# Patient Record
Sex: FEMALE | Race: WHITE | NOT HISPANIC OR LATINO | Employment: FULL TIME | URBAN - METROPOLITAN AREA
[De-identification: names, ages, dates, MRNs, and addresses within clinical notes are randomized per-mention and may not be internally consistent; named-entity substitution may affect disease eponyms.]

---

## 2022-01-12 ENCOUNTER — LAB VISIT (OUTPATIENT)
Dept: LAB | Facility: OTHER | Age: 27
End: 2022-01-12
Payer: COMMERCIAL

## 2022-01-12 ENCOUNTER — OFFICE VISIT (OUTPATIENT)
Dept: OBSTETRICS AND GYNECOLOGY | Facility: CLINIC | Age: 27
End: 2022-01-12
Attending: OBSTETRICS & GYNECOLOGY
Payer: COMMERCIAL

## 2022-01-12 VITALS
BODY MASS INDEX: 22.96 KG/M2 | HEIGHT: 69 IN | DIASTOLIC BLOOD PRESSURE: 70 MMHG | SYSTOLIC BLOOD PRESSURE: 122 MMHG | WEIGHT: 155 LBS

## 2022-01-12 DIAGNOSIS — Z01.419 WELL WOMAN EXAM: Primary | ICD-10-CM

## 2022-01-12 DIAGNOSIS — Z11.3 SCREEN FOR STD (SEXUALLY TRANSMITTED DISEASE): ICD-10-CM

## 2022-01-12 DIAGNOSIS — Z23 NEEDS FLU SHOT: ICD-10-CM

## 2022-01-12 DIAGNOSIS — Z01.419 ENCOUNTER FOR CERVICAL PAP SMEAR WITH PELVIC EXAM: ICD-10-CM

## 2022-01-12 DIAGNOSIS — Z30.09 ENCOUNTER FOR COUNSELING REGARDING CONTRACEPTION: ICD-10-CM

## 2022-01-12 LAB
B-HCG UR QL: NEGATIVE
CTP QC/QA: YES
HBV SURFACE AG SERPL QL IA: NEGATIVE
HIV 1+2 AB+HIV1 P24 AG SERPL QL IA: NEGATIVE

## 2022-01-12 PROCEDURE — 81025 URINE PREGNANCY TEST: CPT | Mod: S$GLB,,, | Performed by: PHYSICIAN ASSISTANT

## 2022-01-12 PROCEDURE — 87624 HPV HI-RISK TYP POOLED RSLT: CPT | Performed by: PHYSICIAN ASSISTANT

## 2022-01-12 PROCEDURE — 3074F PR MOST RECENT SYSTOLIC BLOOD PRESSURE < 130 MM HG: ICD-10-PCS | Mod: CPTII,S$GLB,, | Performed by: PHYSICIAN ASSISTANT

## 2022-01-12 PROCEDURE — 1160F RVW MEDS BY RX/DR IN RCRD: CPT | Mod: CPTII,S$GLB,, | Performed by: PHYSICIAN ASSISTANT

## 2022-01-12 PROCEDURE — 3078F PR MOST RECENT DIASTOLIC BLOOD PRESSURE < 80 MM HG: ICD-10-PCS | Mod: CPTII,S$GLB,, | Performed by: PHYSICIAN ASSISTANT

## 2022-01-12 PROCEDURE — 81025 POCT URINE PREGNANCY: ICD-10-PCS | Mod: S$GLB,,, | Performed by: PHYSICIAN ASSISTANT

## 2022-01-12 PROCEDURE — 36415 COLL VENOUS BLD VENIPUNCTURE: CPT | Performed by: PHYSICIAN ASSISTANT

## 2022-01-12 PROCEDURE — 3008F BODY MASS INDEX DOCD: CPT | Mod: CPTII,S$GLB,, | Performed by: PHYSICIAN ASSISTANT

## 2022-01-12 PROCEDURE — 87591 N.GONORRHOEAE DNA AMP PROB: CPT | Performed by: PHYSICIAN ASSISTANT

## 2022-01-12 PROCEDURE — 3074F SYST BP LT 130 MM HG: CPT | Mod: CPTII,S$GLB,, | Performed by: PHYSICIAN ASSISTANT

## 2022-01-12 PROCEDURE — 99385 PR PREVENTIVE VISIT,NEW,18-39: ICD-10-PCS | Mod: 25,S$GLB,, | Performed by: PHYSICIAN ASSISTANT

## 2022-01-12 PROCEDURE — 88175 CYTOPATH C/V AUTO FLUID REDO: CPT | Performed by: PHYSICIAN ASSISTANT

## 2022-01-12 PROCEDURE — 1159F PR MEDICATION LIST DOCUMENTED IN MEDICAL RECORD: ICD-10-PCS | Mod: CPTII,S$GLB,, | Performed by: PHYSICIAN ASSISTANT

## 2022-01-12 PROCEDURE — 87340 HEPATITIS B SURFACE AG IA: CPT | Performed by: PHYSICIAN ASSISTANT

## 2022-01-12 PROCEDURE — 87389 HIV-1 AG W/HIV-1&-2 AB AG IA: CPT | Performed by: PHYSICIAN ASSISTANT

## 2022-01-12 PROCEDURE — 1159F MED LIST DOCD IN RCRD: CPT | Mod: CPTII,S$GLB,, | Performed by: PHYSICIAN ASSISTANT

## 2022-01-12 PROCEDURE — 87481 CANDIDA DNA AMP PROBE: CPT | Mod: 59 | Performed by: PHYSICIAN ASSISTANT

## 2022-01-12 PROCEDURE — 86592 SYPHILIS TEST NON-TREP QUAL: CPT | Performed by: PHYSICIAN ASSISTANT

## 2022-01-12 PROCEDURE — 87801 DETECT AGNT MULT DNA AMPLI: CPT | Performed by: PHYSICIAN ASSISTANT

## 2022-01-12 PROCEDURE — 3078F DIAST BP <80 MM HG: CPT | Mod: CPTII,S$GLB,, | Performed by: PHYSICIAN ASSISTANT

## 2022-01-12 PROCEDURE — 99999 PR PBB SHADOW E&M-NEW PATIENT-LVL II: CPT | Mod: PBBFAC,,, | Performed by: PHYSICIAN ASSISTANT

## 2022-01-12 PROCEDURE — 3008F PR BODY MASS INDEX (BMI) DOCUMENTED: ICD-10-PCS | Mod: CPTII,S$GLB,, | Performed by: PHYSICIAN ASSISTANT

## 2022-01-12 PROCEDURE — 99999 PR PBB SHADOW E&M-NEW PATIENT-LVL II: ICD-10-PCS | Mod: PBBFAC,,, | Performed by: PHYSICIAN ASSISTANT

## 2022-01-12 PROCEDURE — 99385 PREV VISIT NEW AGE 18-39: CPT | Mod: 25,S$GLB,, | Performed by: PHYSICIAN ASSISTANT

## 2022-01-12 PROCEDURE — 1160F PR REVIEW ALL MEDS BY PRESCRIBER/CLIN PHARMACIST DOCUMENTED: ICD-10-PCS | Mod: CPTII,S$GLB,, | Performed by: PHYSICIAN ASSISTANT

## 2022-01-12 PROCEDURE — 87491 CHLMYD TRACH DNA AMP PROBE: CPT | Performed by: PHYSICIAN ASSISTANT

## 2022-01-12 RX ORDER — SPIRONOLACTONE 100 MG/1
100 TABLET, FILM COATED ORAL DAILY
COMMUNITY
Start: 2022-01-11

## 2022-01-12 NOTE — PROGRESS NOTES
Subjective:       Patient ID: Mandy Ayon is a 26 y.o. female.    Chief Complaint   Patient presents with    Well Woman       History of Present Illness:    Mandy Ayon is a  26 y.o. woman who presents for her annual exam.     Annual Exam-Premenopausal  Patient presents for annual exam. The patient has no complaints today. The patient is sexually active. GYN screening history: no prior history of gyn screening tests. The patient reports that there is not domestic violence in her life.  Contraception Counseling  Patient presents for contraception counseling. The patient has no complaints today. The patient is sexually active. Pertinent past medical history: none.    Vaginal Discharge and Irritation  Patient presents for vaginal discharge check. Sexual history reviewed with the patient. STD exposure: denies knowledge of risky exposure.  Previous history of STD:  none. Current symptoms include none.  Contraception: none.    Covid vaccine status: vaccinated  Flu vaccine status: getting today  Gardasil vaccine status: vaccinated      Menstrual History: reports periods are regular - lasting 5-7 days; denies dysmenorrhea or menorrhagia  Obstetric Hx:   STD/STI Hx: none  Contraception Hx: none      GYN & OB History  Patient's last menstrual period was 2022 (exact date).   Date of last PAP: no hx    OB History    Para Term  AB Living   0 0 0 0 0 0   SAB IAB Ectopic Multiple Live Births   0 0 0 0 0        History reviewed. No pertinent past medical history.     History reviewed. No pertinent surgical history.     Social History     Socioeconomic History    Marital status: Single   Tobacco Use    Smoking status: Never Smoker    Smokeless tobacco: Never Used   Substance and Sexual Activity    Alcohol use: Yes    Drug use: Never    Sexual activity: Yes     Partners: Male     Birth control/protection: Condom        Family History   Problem Relation Age of Onset    Breast  cancer Neg Hx     Colon cancer Neg Hx     Ovarian cancer Neg Hx           ROS:  GENERAL: Feeling well overall. Denies fever or chills.   SKIN: Denies rash or lesions.   HEAD: Denies head injury or headache.   NODES: Denies enlarged lymph nodes.   CHEST: Denies chest pain or shortness of breath.   CARDIOVASCULAR: Denies palpitations or left sided chest pain.   ABDOMEN: No abdominal pain, constipation, diarrhea, nausea, vomiting or rectal bleeding.   URINARY: No dysuria, hematuria, or burning on urination.  REPRODUCTIVE: See HPI.   BREASTS: Denies pain, lumps, or nipple discharge.   HEMATOLOGIC: No easy bruisability or excessive bleeding.   MUSCULOSKELETAL: Denies joint pain or swelling.   NEUROLOGIC: Denies syncope or weakness.   PSYCHIATRIC: Denies depression, anxiety or mood swings.      Objective:     PE:   APPEARANCE: Well nourished, well developed female in no acute distress.  NODES: no cervical, supraclavicular, or inguinal lymphadenopathy  BREASTS: Symmetrical, no skin changes or visible lesions. No palpable masses, nipple discharge or adenopathy bilaterally.  ABDOMEN: Soft. No tenderness or masses. No distention. No hernias palpated. No CVA tenderness.  VULVA: No lesions. Normal external female genitalia.  URETHRAL MEATUS: Normal size and location, no lesions, no prolapse.  URETHRA: No masses, tenderness, or prolapse.  VAGINA: Moist. No lesions or lacerations noted. No abnormal discharge present. No odor present.    CERVIX: No lesions or discharge. No cervical motion tenderness.   UTERUS: Normal size, regular shape, mobile, non-tender.  ADNEXA: No tenderness. No fullness or masses palpated in the adnexal regions.   ANUS PERINEUM: Normal.  SKIN: No rashes, lesions, ulcers, acne, hirsutism.  RESP: Normal respiratory effort.  MENTAL STATUS: Alert, oriented x 3, normal affect and mood.    Assessment:     1. Well woman exam    2. Encounter for cervical Pap smear with pelvic exam    3. Screen for STD (sexually  transmitted disease)    4. Encounter for counseling regarding contraception    5. Needs flu shot         Office Visit on 01/12/2022   Component Date Value Ref Range Status    POC Preg Test, Ur 01/12/2022 Negative  Negative Final     Acceptable 01/12/2022 Yes   Final       Plan:     Well woman exam  -     POCT Urine Pregnancy    Encounter for cervical Pap smear with pelvic exam  -     Liquid-Based Pap Smear, Screening  -     HPV High Risk Genotypes, PCR    Screen for STD (sexually transmitted disease)  -     C. trachomatis/N. gonorrhoeae by AMP DNA [facility]; Cervix  -     Vaginosis Screen by DNA Probe  -     HIV 1/2 Ag/Ab (4th Gen); Future; Expected date: 01/12/2022  -     RPR; Future; Expected date: 01/12/2022  -     Hepatitis B Surface Antigen; Future; Expected date: 01/12/2022    Encounter for counseling regarding contraception    Needs flu shot  -     Influenza - Quadrivalent *Preferred* (6 months+) (PF); Future; Expected date: 01/12/2022         Well Woman:    - Pap smear: collected  - Birth control: interested in contraception.  Patient was counseled today on contraceptive options: barrier, hormonal (OCPs, Depo-Provera, NuvaRing, Nexplanon), IUDs (Kyleena, Mirena, ParaGard), etc. Patient would like nexplanon. Orders place and pt scheduled for insertion.  - GC/CT: Collected  - Affirm: Collected  - RPR/HIV/Hep B: ordered and scheduled in lab  - Mammogram: n/a  - Smoking cessation: n/a  - Vaccines: covid, hpv vaccinated; flu today      Patient was counseled today on the new ACS guidelines for cervical cytology screening as well as the current recommendations for breast cancer screening. She was counseled to follow up with her PCP for other routine health maintenance.     F/u in 1 year or sooner PRN.    Chacha Hagan PA-C

## 2022-01-13 LAB — RPR SER QL: NORMAL

## 2022-01-18 LAB
C TRACH DNA SPEC QL NAA+PROBE: NOT DETECTED
CYTOLOGIST CVX/VAG CYTO: NORMAL
CYTOLOGY CVX/VAG DOC CYTO: NORMAL
CYTOLOGY CVX/VAG DOC THIN PREP: NORMAL
CYTOLOGY THINPREP PAP COMMENT: NORMAL
HPV HR 12 DNA CVX QL NAA+PROBE: NEGATIVE
HPV16 DNA CVX QL NAA+PROBE: NEGATIVE
HPV18 DNA CVX QL NAA+PROBE: NEGATIVE
N GONORRHOEA DNA SPEC QL NAA+PROBE: NOT DETECTED
PAP NOTE: NORMAL
STAT OF ADQ CVX/VAG CYTO-IMP: NORMAL

## 2022-01-19 DIAGNOSIS — B96.89 BACTERIAL VAGINOSIS: Primary | ICD-10-CM

## 2022-01-19 DIAGNOSIS — N76.0 BACTERIAL VAGINOSIS: Primary | ICD-10-CM

## 2022-01-19 LAB
BACTERIAL VAGINOSIS DNA: POSITIVE
CANDIDA GLABRATA DNA: NEGATIVE
CANDIDA KRUSEI DNA: NEGATIVE
CANDIDA RRNA VAG QL PROBE: NEGATIVE
T VAGINALIS RRNA GENITAL QL PROBE: NEGATIVE

## 2022-01-19 RX ORDER — METRONIDAZOLE 500 MG/1
500 TABLET ORAL EVERY 12 HOURS
Qty: 14 TABLET | Refills: 0 | Status: SHIPPED | OUTPATIENT
Start: 2022-01-19 | End: 2022-01-26

## 2022-02-03 ENCOUNTER — OFFICE VISIT (OUTPATIENT)
Dept: PRIMARY CARE CLINIC | Facility: CLINIC | Age: 27
End: 2022-02-03
Payer: COMMERCIAL

## 2022-02-03 ENCOUNTER — LAB VISIT (OUTPATIENT)
Dept: LAB | Facility: HOSPITAL | Age: 27
End: 2022-02-03
Attending: FAMILY MEDICINE
Payer: COMMERCIAL

## 2022-02-03 VITALS
DIASTOLIC BLOOD PRESSURE: 76 MMHG | WEIGHT: 156.31 LBS | BODY MASS INDEX: 23.15 KG/M2 | HEIGHT: 69 IN | SYSTOLIC BLOOD PRESSURE: 124 MMHG | TEMPERATURE: 98 F | HEART RATE: 114 BPM | OXYGEN SATURATION: 98 %

## 2022-02-03 DIAGNOSIS — R00.0 TACHYCARDIA: Primary | ICD-10-CM

## 2022-02-03 DIAGNOSIS — R63.2 POLYPHAGIA: ICD-10-CM

## 2022-02-03 DIAGNOSIS — Z00.00 ANNUAL PHYSICAL EXAM: ICD-10-CM

## 2022-02-03 DIAGNOSIS — R63.4 WEIGHT LOSS: ICD-10-CM

## 2022-02-03 DIAGNOSIS — R00.0 TACHYCARDIA: ICD-10-CM

## 2022-02-03 PROCEDURE — 99999 PR PBB SHADOW E&M-EST. PATIENT-LVL IV: ICD-10-PCS | Mod: PBBFAC,,, | Performed by: FAMILY MEDICINE

## 2022-02-03 PROCEDURE — 83735 ASSAY OF MAGNESIUM: CPT | Performed by: FAMILY MEDICINE

## 2022-02-03 PROCEDURE — 1160F RVW MEDS BY RX/DR IN RCRD: CPT | Mod: CPTII,S$GLB,, | Performed by: FAMILY MEDICINE

## 2022-02-03 PROCEDURE — 3078F DIAST BP <80 MM HG: CPT | Mod: CPTII,S$GLB,, | Performed by: FAMILY MEDICINE

## 2022-02-03 PROCEDURE — 3008F PR BODY MASS INDEX (BMI) DOCUMENTED: ICD-10-PCS | Mod: CPTII,S$GLB,, | Performed by: FAMILY MEDICINE

## 2022-02-03 PROCEDURE — 99203 PR OFFICE/OUTPT VISIT, NEW, LEVL III, 30-44 MIN: ICD-10-PCS | Mod: S$GLB,,, | Performed by: FAMILY MEDICINE

## 2022-02-03 PROCEDURE — 3078F PR MOST RECENT DIASTOLIC BLOOD PRESSURE < 80 MM HG: ICD-10-PCS | Mod: CPTII,S$GLB,, | Performed by: FAMILY MEDICINE

## 2022-02-03 PROCEDURE — 80053 COMPREHEN METABOLIC PANEL: CPT | Performed by: FAMILY MEDICINE

## 2022-02-03 PROCEDURE — 99999 PR PBB SHADOW E&M-EST. PATIENT-LVL IV: CPT | Mod: PBBFAC,,, | Performed by: FAMILY MEDICINE

## 2022-02-03 PROCEDURE — 3008F BODY MASS INDEX DOCD: CPT | Mod: CPTII,S$GLB,, | Performed by: FAMILY MEDICINE

## 2022-02-03 PROCEDURE — 85025 COMPLETE CBC W/AUTO DIFF WBC: CPT | Performed by: FAMILY MEDICINE

## 2022-02-03 PROCEDURE — 84443 ASSAY THYROID STIM HORMONE: CPT | Performed by: FAMILY MEDICINE

## 2022-02-03 PROCEDURE — 3074F PR MOST RECENT SYSTOLIC BLOOD PRESSURE < 130 MM HG: ICD-10-PCS | Mod: CPTII,S$GLB,, | Performed by: FAMILY MEDICINE

## 2022-02-03 PROCEDURE — 3074F SYST BP LT 130 MM HG: CPT | Mod: CPTII,S$GLB,, | Performed by: FAMILY MEDICINE

## 2022-02-03 PROCEDURE — 99203 OFFICE O/P NEW LOW 30 MIN: CPT | Mod: S$GLB,,, | Performed by: FAMILY MEDICINE

## 2022-02-03 PROCEDURE — 1159F MED LIST DOCD IN RCRD: CPT | Mod: CPTII,S$GLB,, | Performed by: FAMILY MEDICINE

## 2022-02-03 PROCEDURE — 1160F PR REVIEW ALL MEDS BY PRESCRIBER/CLIN PHARMACIST DOCUMENTED: ICD-10-PCS | Mod: CPTII,S$GLB,, | Performed by: FAMILY MEDICINE

## 2022-02-03 PROCEDURE — 36415 COLL VENOUS BLD VENIPUNCTURE: CPT | Mod: PN | Performed by: FAMILY MEDICINE

## 2022-02-03 PROCEDURE — 93005 EKG 12-LEAD: ICD-10-PCS | Mod: S$GLB,,, | Performed by: FAMILY MEDICINE

## 2022-02-03 PROCEDURE — 84439 ASSAY OF FREE THYROXINE: CPT | Performed by: FAMILY MEDICINE

## 2022-02-03 PROCEDURE — 1159F PR MEDICATION LIST DOCUMENTED IN MEDICAL RECORD: ICD-10-PCS | Mod: CPTII,S$GLB,, | Performed by: FAMILY MEDICINE

## 2022-02-03 PROCEDURE — 93010 EKG 12-LEAD: ICD-10-PCS | Mod: S$GLB,,, | Performed by: INTERNAL MEDICINE

## 2022-02-03 PROCEDURE — 86803 HEPATITIS C AB TEST: CPT | Performed by: FAMILY MEDICINE

## 2022-02-03 PROCEDURE — 87389 HIV-1 AG W/HIV-1&-2 AB AG IA: CPT | Performed by: FAMILY MEDICINE

## 2022-02-03 PROCEDURE — 93010 ELECTROCARDIOGRAM REPORT: CPT | Mod: S$GLB,,, | Performed by: INTERNAL MEDICINE

## 2022-02-03 PROCEDURE — 93005 ELECTROCARDIOGRAM TRACING: CPT | Mod: S$GLB,,, | Performed by: FAMILY MEDICINE

## 2022-02-03 NOTE — PROGRESS NOTES
[facility] Note    Chief Complaint      Chief Complaint   Patient presents with    Headache    Shortness of Breath    Palpitations    Generalized Body Aches    Weight Loss       History of Present Illness      Mandy Ayon is a 26 y.o. female with no chronic conditions of who presents today for:    4-5 months of decreased energy, increased heart rate, muscle aches and spasm, weight loss.  wakes with a headache  Takes tylenol in am and improves by 1  SOB with exertion, preceded COVID infection  BM normal  Monthly menses but now fewer days  Urine normal  Appetite increased  No swelling  Feels overheated frequently  Also notes blurred vision    Had covid in December.  Had cold symproms congestion and rhinorrhea.    On aldactone for acne since 2014.   Vitamin C, B12 and oregano oil.    Patient Care Team:  Primary Doctor No as PCP - General     Health Maintenance:  Immunization History   Administered Date(s) Administered    COVID-19, MRNA, LN-S, PF (Pfizer) (Purple Cap) 03/16/2021, 04/06/2021    DTaP 02/28/1996, 04/24/1996, 06/17/1996, 02/27/1998, 08/21/2000    HIB 02/28/1996, 04/24/1996, 06/17/1996, 08/14/1997    HPV Quadrivalent 05/16/2009, 09/21/2009, 12/21/2009    Hepatitis B, Pediatric/Adolescent 1995, 01/17/1996, 09/27/1996    IPV 08/21/2000    Influenza - Quadrivalent 01/29/2020    Influenza A (H1N1) 2009 Monovalent - IM 12/21/2009    MMR 03/14/1997, 08/21/2000    Meningococcal Conjugate (MCV4P) 09/21/2009, 08/09/2014    OPV 02/28/1996, 04/28/1996, 02/27/1998    Tdap 05/30/2007, 08/29/2018      Health Maintenance   Topic Date Due    Hepatitis C Screening  Never done    Lipid Panel  Never done    Pap Smear  01/12/2025    TETANUS VACCINE  08/29/2028    HPV Vaccines  Completed        Past Medical History:  History reviewed. No pertinent past medical history.    Past Surgical History:   has a past surgical history that includes none.    Family History:  family history  "includes Diabetes in her maternal grandmother.     Social History:  Social History     Tobacco Use    Smoking status: Never Smoker    Smokeless tobacco: Never Used   Substance Use Topics    Alcohol use: Yes     Comment: weekly    Drug use: Never       Review of Systems   Constitutional: Negative for fever.   Respiratory: Negative for shortness of breath.    Cardiovascular: Negative for chest pain.   Gastrointestinal: Negative for change in bowel habit and change in bowel habit.   Genitourinary: Negative for difficulty urinating.        Medications:    Current Outpatient Medications:     spironolactone (ALDACTONE) 100 MG tablet, Take 100 mg by mouth once daily., Disp: , Rfl:      Allergies:  Review of patient's allergies indicates:  No Known Allergies    Physical Exam      Vital Signs  Temp: 98 °F (36.7 °C)  Temp src: Oral  Pulse: (!) 114  SpO2: 98 %  BP: 124/76  Pain Score: 0-No pain  Height and Weight  Height: 5' 9" (175.3 cm)  Weight: 70.9 kg (156 lb 4.9 oz)  BSA (Calculated - sq m): 1.86 sq meters  BMI (Calculated): 23.1  Weight in (lb) to have BMI = 25: 168.9             Physical Exam  Vitals reviewed.   Constitutional:       General: She is not in acute distress.     Appearance: Normal appearance.   HENT:      Right Ear: Tympanic membrane, ear canal and external ear normal.      Left Ear: Tympanic membrane, ear canal and external ear normal.      Mouth/Throat:      Mouth: Mucous membranes are moist.      Pharynx: Oropharynx is clear. No oropharyngeal exudate or posterior oropharyngeal erythema.   Eyes:      Extraocular Movements: Extraocular movements intact.      Conjunctiva/sclera: Conjunctivae normal.      Pupils: Pupils are equal, round, and reactive to light.      Comments: exophthalmos - can visualize full circumference of iris at rest   Cardiovascular:      Rate and Rhythm: Regular rhythm. Tachycardia present.      Pulses: Normal pulses.      Heart sounds: No murmur heard.  No friction rub. No " gallop.       Comments: Prominent apical impulse  Pulmonary:      Effort: Pulmonary effort is normal.      Breath sounds: No wheezing, rhonchi or rales.   Abdominal:      General: Abdomen is flat. Bowel sounds are normal. There is no distension.      Palpations: Abdomen is soft. There is no mass.      Tenderness: There is no abdominal tenderness.   Musculoskeletal:      Cervical back: Neck supple.      Right lower leg: No edema.      Left lower leg: No edema.   Lymphadenopathy:      Cervical: No cervical adenopathy.   Skin:     General: Skin is warm and dry.   Neurological:      General: No focal deficit present.      Mental Status: She is alert.   Psychiatric:         Mood and Affect: Mood normal.         Behavior: Behavior normal.          Laboratory:  CBC:        CMP:            URINALYSIS:         LIPIDS:        TSH:        A1C:        Urine Microalbumin/Cr:          Other:             Assessment/Plan     Mandy Ayon is a 26 y.o.female with:    Tachycardia  -     T4, Free; Future; Expected date: 02/03/2022  -     TSH; Future; Expected date: 02/03/2022  -     CBC Auto Differential; Future; Expected date: 02/03/2022  -     Comprehensive Metabolic Panel; Future; Expected date: 02/03/2022  -     Magnesium; Future; Expected date: 02/03/2022  EKG NSR, suspect hyperthyropidism    Polyphagia  -     T4, Free; Future; Expected date: 02/03/2022  -     TSH; Future; Expected date: 02/03/2022  -     CBC Auto Differential; Future; Expected date: 02/03/2022  -     Comprehensive Metabolic Panel; Future; Expected date: 02/03/2022    Weight loss    Annual physical exam  -     HIV 1/2 Ag/Ab (4th Gen); Future; Expected date: 02/03/2022  -     Hepatitis C Antibody; Future; Expected date: 02/03/2022     Suspect hyperthyroidism, check labs and revisit pending results    Chronic conditions status updated as per HPI.  Other than changes above, cont current medications and maintain follow up with specialists.  Return to clinic in  Follow up in about 4 weeks (around 3/3/2022), or if symptoms worsen or fail to improve.      Yolande Case MD  [facility]

## 2022-02-04 ENCOUNTER — PATIENT MESSAGE (OUTPATIENT)
Dept: PRIMARY CARE CLINIC | Facility: CLINIC | Age: 27
End: 2022-02-04
Payer: COMMERCIAL

## 2022-02-04 DIAGNOSIS — E05.90 HYPERTHYROIDISM: Primary | ICD-10-CM

## 2022-02-04 DIAGNOSIS — R79.89 ELEVATED LIVER FUNCTION TESTS: ICD-10-CM

## 2022-02-04 DIAGNOSIS — E83.52 HYPERCALCEMIA: ICD-10-CM

## 2022-02-04 LAB
ALBUMIN SERPL BCP-MCNC: 4.1 G/DL (ref 3.5–5.2)
ALP SERPL-CCNC: 99 U/L (ref 55–135)
ALT SERPL W/O P-5'-P-CCNC: 52 U/L (ref 10–44)
ANION GAP SERPL CALC-SCNC: 11 MMOL/L (ref 8–16)
AST SERPL-CCNC: 71 U/L (ref 10–40)
BASOPHILS # BLD AUTO: 0.04 K/UL (ref 0–0.2)
BASOPHILS NFR BLD: 0.6 % (ref 0–1.9)
BILIRUB SERPL-MCNC: 0.7 MG/DL (ref 0.1–1)
BUN SERPL-MCNC: 10 MG/DL (ref 6–20)
CALCIUM SERPL-MCNC: 12 MG/DL (ref 8.7–10.5)
CHLORIDE SERPL-SCNC: 103 MMOL/L (ref 95–110)
CO2 SERPL-SCNC: 24 MMOL/L (ref 23–29)
CREAT SERPL-MCNC: 0.6 MG/DL (ref 0.5–1.4)
DIFFERENTIAL METHOD: ABNORMAL
EOSINOPHIL # BLD AUTO: 0.1 K/UL (ref 0–0.5)
EOSINOPHIL NFR BLD: 1 % (ref 0–8)
ERYTHROCYTE [DISTWIDTH] IN BLOOD BY AUTOMATED COUNT: 12.7 % (ref 11.5–14.5)
EST. GFR  (AFRICAN AMERICAN): >60 ML/MIN/1.73 M^2
EST. GFR  (NON AFRICAN AMERICAN): >60 ML/MIN/1.73 M^2
GLUCOSE SERPL-MCNC: 75 MG/DL (ref 70–110)
HCT VFR BLD AUTO: 40.1 % (ref 37–48.5)
HCV AB SERPL QL IA: NEGATIVE
HGB BLD-MCNC: 12.4 G/DL (ref 12–16)
HIV 1+2 AB+HIV1 P24 AG SERPL QL IA: NEGATIVE
IMM GRANULOCYTES # BLD AUTO: 0.02 K/UL (ref 0–0.04)
IMM GRANULOCYTES NFR BLD AUTO: 0.3 % (ref 0–0.5)
LYMPHOCYTES # BLD AUTO: 2 K/UL (ref 1–4.8)
LYMPHOCYTES NFR BLD: 28.8 % (ref 18–48)
MAGNESIUM SERPL-MCNC: 1.3 MG/DL (ref 1.6–2.6)
MCH RBC QN AUTO: 26.4 PG (ref 27–31)
MCHC RBC AUTO-ENTMCNC: 30.9 G/DL (ref 32–36)
MCV RBC AUTO: 85 FL (ref 82–98)
MONOCYTES # BLD AUTO: 0.6 K/UL (ref 0.3–1)
MONOCYTES NFR BLD: 8.6 % (ref 4–15)
NEUTROPHILS # BLD AUTO: 4.3 K/UL (ref 1.8–7.7)
NEUTROPHILS NFR BLD: 60.7 % (ref 38–73)
NRBC BLD-RTO: 0 /100 WBC
PLATELET # BLD AUTO: 313 K/UL (ref 150–450)
PMV BLD AUTO: 11.6 FL (ref 9.2–12.9)
POTASSIUM SERPL-SCNC: 4.2 MMOL/L (ref 3.5–5.1)
PROT SERPL-MCNC: 7.6 G/DL (ref 6–8.4)
RBC # BLD AUTO: 4.7 M/UL (ref 4–5.4)
SODIUM SERPL-SCNC: 138 MMOL/L (ref 136–145)
T4 FREE SERPL-MCNC: 2.83 NG/DL (ref 0.71–1.51)
TSH SERPL DL<=0.005 MIU/L-ACNC: <0.01 UIU/ML (ref 0.4–4)
WBC # BLD AUTO: 7.09 K/UL (ref 3.9–12.7)

## 2022-02-04 RX ORDER — ATENOLOL 25 MG/1
25 TABLET ORAL DAILY
Qty: 90 TABLET | Refills: 0 | Status: SHIPPED | OUTPATIENT
Start: 2022-02-04 | End: 2022-06-10

## 2022-02-05 NOTE — PROGRESS NOTES
Can we try to get her an appointment wit endocrine this week.  She has Graves which requires urgent treatment. I put referral in as urgent.

## 2022-02-07 ENCOUNTER — TELEPHONE (OUTPATIENT)
Dept: PRIMARY CARE CLINIC | Facility: CLINIC | Age: 27
End: 2022-02-07
Payer: COMMERCIAL

## 2022-02-07 NOTE — TELEPHONE ENCOUNTER
Left message with patient to further explain diagnosis and confirm she got propranolol and that we got her endocrine appt on 2/9

## 2022-02-08 ENCOUNTER — PROCEDURE VISIT (OUTPATIENT)
Dept: OBSTETRICS AND GYNECOLOGY | Facility: CLINIC | Age: 27
End: 2022-02-08
Payer: COMMERCIAL

## 2022-02-08 DIAGNOSIS — Z30.017 NEXPLANON INSERTION: Primary | ICD-10-CM

## 2022-02-08 LAB
B-HCG UR QL: NEGATIVE
CTP QC/QA: YES

## 2022-02-08 PROCEDURE — 11981 INSERTION DRUG DLVR IMPLANT: CPT | Mod: S$GLB,,, | Performed by: PHYSICIAN ASSISTANT

## 2022-02-08 PROCEDURE — 11981 INSERTION OF NEXPLANON: ICD-10-PCS | Mod: S$GLB,,, | Performed by: PHYSICIAN ASSISTANT

## 2022-02-08 PROCEDURE — 81025 POCT URINE PREGNANCY: ICD-10-PCS | Mod: S$GLB,,, | Performed by: PHYSICIAN ASSISTANT

## 2022-02-08 PROCEDURE — 81025 URINE PREGNANCY TEST: CPT | Mod: S$GLB,,, | Performed by: PHYSICIAN ASSISTANT

## 2022-02-08 NOTE — PROCEDURES
Insertion of Nexplanon    Date/Time: 2/8/2022 9:20 AM  Performed by: Chacha Hagan PA-C  Authorized by: Chacha Hagan PA-C     Consent obtained:  Verbal and written  Consent given by:  Patient  Patient questions answered: yes    Patient agrees, verbalizes understanding, and wants to proceed: yes    Educational handouts given: yes    Instructions and paperwork completed: yes    Premeds:  Acetaminophen  Prepped with: alcohol 70% and povidone-iodine    Local anesthetic:  Lidocaine 1% (5 mL)  The site was cleaned and prepped in a sterile fashion: yes    Small stab incision was made in arm: yes    Left/right:  Left   68 mg etonogestrel 68 mg  Preloaded Implanon trocar was placed subdermally: yes    Visualization of implant was obtained: yes    Nexplanon was inserted and trocar removed: yes    Visualization of notch in stilette and palpitation of device: yes    Palpitation confirms placement by provider and patient: yes    Site was closed with steri-strips and pressure bandage applied: yes

## 2022-02-09 ENCOUNTER — OFFICE VISIT (OUTPATIENT)
Dept: ENDOCRINOLOGY | Facility: CLINIC | Age: 27
End: 2022-02-09
Payer: COMMERCIAL

## 2022-02-09 VITALS
HEIGHT: 69 IN | HEART RATE: 68 BPM | WEIGHT: 160.63 LBS | OXYGEN SATURATION: 99 % | BODY MASS INDEX: 23.79 KG/M2 | DIASTOLIC BLOOD PRESSURE: 70 MMHG | SYSTOLIC BLOOD PRESSURE: 105 MMHG

## 2022-02-09 DIAGNOSIS — E83.52 HYPERCALCEMIA: ICD-10-CM

## 2022-02-09 DIAGNOSIS — E05.00 GRAVES' DISEASE: Primary | ICD-10-CM

## 2022-02-09 DIAGNOSIS — E05.00 GRAVES' EYE DISEASE: ICD-10-CM

## 2022-02-09 DIAGNOSIS — E05.90 HYPERTHYROIDISM: ICD-10-CM

## 2022-02-09 DIAGNOSIS — R74.8 ABNORMAL LIVER ENZYMES: ICD-10-CM

## 2022-02-09 PROCEDURE — 3074F SYST BP LT 130 MM HG: CPT | Mod: CPTII,S$GLB,, | Performed by: INTERNAL MEDICINE

## 2022-02-09 PROCEDURE — 3008F BODY MASS INDEX DOCD: CPT | Mod: CPTII,S$GLB,, | Performed by: INTERNAL MEDICINE

## 2022-02-09 PROCEDURE — 99999 PR PBB SHADOW E&M-EST. PATIENT-LVL V: CPT | Mod: PBBFAC,,, | Performed by: INTERNAL MEDICINE

## 2022-02-09 PROCEDURE — 1160F PR REVIEW ALL MEDS BY PRESCRIBER/CLIN PHARMACIST DOCUMENTED: ICD-10-PCS | Mod: CPTII,S$GLB,, | Performed by: INTERNAL MEDICINE

## 2022-02-09 PROCEDURE — 1159F MED LIST DOCD IN RCRD: CPT | Mod: CPTII,S$GLB,, | Performed by: INTERNAL MEDICINE

## 2022-02-09 PROCEDURE — 1160F RVW MEDS BY RX/DR IN RCRD: CPT | Mod: CPTII,S$GLB,, | Performed by: INTERNAL MEDICINE

## 2022-02-09 PROCEDURE — 99999 PR PBB SHADOW E&M-EST. PATIENT-LVL V: ICD-10-PCS | Mod: PBBFAC,,, | Performed by: INTERNAL MEDICINE

## 2022-02-09 PROCEDURE — 3078F PR MOST RECENT DIASTOLIC BLOOD PRESSURE < 80 MM HG: ICD-10-PCS | Mod: CPTII,S$GLB,, | Performed by: INTERNAL MEDICINE

## 2022-02-09 PROCEDURE — 3008F PR BODY MASS INDEX (BMI) DOCUMENTED: ICD-10-PCS | Mod: CPTII,S$GLB,, | Performed by: INTERNAL MEDICINE

## 2022-02-09 PROCEDURE — 3078F DIAST BP <80 MM HG: CPT | Mod: CPTII,S$GLB,, | Performed by: INTERNAL MEDICINE

## 2022-02-09 PROCEDURE — 3074F PR MOST RECENT SYSTOLIC BLOOD PRESSURE < 130 MM HG: ICD-10-PCS | Mod: CPTII,S$GLB,, | Performed by: INTERNAL MEDICINE

## 2022-02-09 PROCEDURE — 99204 OFFICE O/P NEW MOD 45 MIN: CPT | Mod: S$GLB,,, | Performed by: INTERNAL MEDICINE

## 2022-02-09 PROCEDURE — 1159F PR MEDICATION LIST DOCUMENTED IN MEDICAL RECORD: ICD-10-PCS | Mod: CPTII,S$GLB,, | Performed by: INTERNAL MEDICINE

## 2022-02-09 PROCEDURE — 99204 PR OFFICE/OUTPT VISIT, NEW, LEVL IV, 45-59 MIN: ICD-10-PCS | Mod: S$GLB,,, | Performed by: INTERNAL MEDICINE

## 2022-02-09 RX ORDER — IBUPROFEN 200 MG
200 TABLET ORAL EVERY 6 HOURS PRN
COMMUNITY

## 2022-02-09 RX ORDER — METHIMAZOLE 10 MG/1
20 TABLET ORAL DAILY
Qty: 60 TABLET | Refills: 11 | Status: SHIPPED | OUTPATIENT
Start: 2022-02-09 | End: 2022-03-09 | Stop reason: SDUPTHER

## 2022-02-09 NOTE — ASSESSMENT & PLAN NOTE
reviewed treatment options of I131, ATD or surgery, surgery being least desirable.    Discussed risks of worsening GO with I131  Review the connection of GO with tob use    Discussed the chance of Graves remission (30%) after 2 years of ATD therapy - with a 50% chance of recurrence  Patient opted for ATD - start methimazole 20 mg qd     Call if fever, sore throat, rash, anorexia, nausea or vomiitng.  Reviewed side effects of ATDs are rare (agranulocytosis and liver failure) but can be very serious.     Labs today and in 4 weeks  Refer to ophtho           Reviewed that Selenium supplementation may decrease inflammatory activity in patients with autoimmune thyroiditis,  and may improve symptoms in patients with mild Graves ophthalmopathy   Dose is 100 mcg twice daily.

## 2022-02-09 NOTE — PROGRESS NOTES
"Subjective:      Patient ID: Mandy Ayon is a 26 y.o. female.    Chief Complaint:  Hyperthyroidism      History of Present Illness  With regards to her hyperthyroidism:    was found to have hyperthyroidism on labs  Lab Results   Component Value Date    TSH <0.010 (L) 02/03/2022         presented with fatigue and palpitations - since nov/dec   Weight loss    Etiology likely  Graves     Thyroid scan: none     Thyroid ultrasound: none     Thyroid ab done:none     Treatment:none     I131 therapy  none      Current hyperthyroid medication: none       On atenolol and is feeling better     current symptoms:   +palpations  weight loss has stabilized   +hungry all the time   Nl bm  No Hair loss  No Brittle nails     +tremor   + anxiety    Insertion of Nexplanon  2/8/22    Denies new eye symptoms:    Is tolerating selenium     On Aldactone for acne    Works for a [employer]       ROS:   As above    Objective:     /70   Pulse 68   Ht 5' 9" (1.753 m)   Wt 72.8 kg (160 lb 9.7 oz)   LMP 02/08/2022 (Exact Date)   SpO2 99%   BMI 23.72 kg/m²   BP Readings from Last 3 Encounters:   02/09/22 105/70   02/03/22 124/76   01/12/22 122/70     Wt Readings from Last 1 Encounters:   02/09/22 0929 72.8 kg (160 lb 9.7 oz)     Body mass index is 23.72 kg/m².      Physical Exam  Vitals reviewed.   Neck:      Comments: Goiter palpable symmetric non tender   Cardiovascular:      Rate and Rhythm: Normal rate.      Pulses: Normal pulses.   Pulmonary:      Breath sounds: Normal breath sounds.   Abdominal:      Palpations: Abdomen is soft.   Lymphadenopathy:      Cervical: No cervical adenopathy.     anxious, tremor onycholysis  +proptosis            Lab Review:   No results found for: HGBA1C  No results found for: CHOL, HDL, LDLCALC, TRIG, CHOLHDL  Lab Results   Component Value Date     02/03/2022    K 4.2 02/03/2022     02/03/2022    CO2 24 02/03/2022    GLU 75 02/03/2022    BUN 10 02/03/2022    CREATININE 0.6 " 02/03/2022    CALCIUM 12.0 (H) 02/03/2022    PROT 7.6 02/03/2022    ALBUMIN 4.1 02/03/2022    BILITOT 0.7 02/03/2022    ALKPHOS 99 02/03/2022    AST 71 (H) 02/03/2022    ALT 52 (H) 02/03/2022    ANIONGAP 11 02/03/2022    ESTGFRAFRICA >60.0 02/03/2022    EGFRNONAA >60.0 02/03/2022    TSH <0.010 (L) 02/03/2022     No results found for: [id number]    Assessment and Plan     Graves' disease  reviewed treatment options of I131, ATD or surgery, surgery being least desirable.    Discussed risks of worsening GO with I131  Review the connection of GO with tob use    Discussed the chance of Graves remission (30%) after 2 years of ATD therapy - with a 50% chance of recurrence  Patient opted for ATD - start methimazole 20 mg qd     Call if fever, sore throat, rash, anorexia, nausea or vomiitng.  Reviewed side effects of ATDs are rare (agranulocytosis and liver failure) but can be very serious.     Labs today and in 4 weeks  Refer to ophtho           Reviewed that Selenium supplementation may decrease inflammatory activity in patients with autoimmune thyroiditis,  and may improve symptoms in patients with mild Graves ophthalmopathy   Dose is 100 mcg twice daily.          Graves' eye disease  As above  Start selenium     Hypercalcemia  Recheck and check pth  Suspect due to graves     Abnormal liver enzymes  Recheck and follow   suspect due to graves

## 2022-02-09 NOTE — PATIENT INSTRUCTIONS
Selenium may improve symptoms in patients with mild Graves ophthalmopathy   Dose is 100 mcg twice daily.

## 2022-02-10 ENCOUNTER — LAB VISIT (OUTPATIENT)
Dept: LAB | Facility: HOSPITAL | Age: 27
End: 2022-02-10
Attending: INTERNAL MEDICINE
Payer: COMMERCIAL

## 2022-02-10 DIAGNOSIS — E83.52 HYPERCALCEMIA: ICD-10-CM

## 2022-02-10 DIAGNOSIS — E05.00 GRAVES' DISEASE: ICD-10-CM

## 2022-02-10 LAB
ALBUMIN SERPL BCP-MCNC: 3.7 G/DL (ref 3.5–5.2)
ANION GAP SERPL CALC-SCNC: 9 MMOL/L (ref 8–16)
BUN SERPL-MCNC: 12 MG/DL (ref 6–20)
CALCIUM SERPL-MCNC: 11.5 MG/DL (ref 8.7–10.5)
CHLORIDE SERPL-SCNC: 105 MMOL/L (ref 95–110)
CO2 SERPL-SCNC: 23 MMOL/L (ref 23–29)
CREAT SERPL-MCNC: 0.6 MG/DL (ref 0.5–1.4)
EST. GFR  (AFRICAN AMERICAN): >60 ML/MIN/1.73 M^2
EST. GFR  (NON AFRICAN AMERICAN): >60 ML/MIN/1.73 M^2
GLUCOSE SERPL-MCNC: 110 MG/DL (ref 70–110)
PHOSPHATE SERPL-MCNC: 3.9 MG/DL (ref 2.7–4.5)
POTASSIUM SERPL-SCNC: 3.9 MMOL/L (ref 3.5–5.1)
PTH-INTACT SERPL-MCNC: 7.1 PG/ML (ref 9–77)
SODIUM SERPL-SCNC: 137 MMOL/L (ref 136–145)
T3 SERPL-MCNC: >500 NG/DL (ref 60–180)
T4 FREE SERPL-MCNC: 2.42 NG/DL (ref 0.71–1.51)
TSH SERPL DL<=0.005 MIU/L-ACNC: <0.01 UIU/ML (ref 0.4–4)

## 2022-02-10 PROCEDURE — 80069 RENAL FUNCTION PANEL: CPT | Performed by: INTERNAL MEDICINE

## 2022-02-10 PROCEDURE — 83520 IMMUNOASSAY QUANT NOS NONAB: CPT | Performed by: INTERNAL MEDICINE

## 2022-02-10 PROCEDURE — 82652 VIT D 1 25-DIHYDROXY: CPT | Performed by: INTERNAL MEDICINE

## 2022-02-10 PROCEDURE — 84439 ASSAY OF FREE THYROXINE: CPT | Performed by: INTERNAL MEDICINE

## 2022-02-10 PROCEDURE — 83970 ASSAY OF PARATHORMONE: CPT | Performed by: INTERNAL MEDICINE

## 2022-02-10 PROCEDURE — 36415 COLL VENOUS BLD VENIPUNCTURE: CPT | Mod: PN | Performed by: INTERNAL MEDICINE

## 2022-02-10 PROCEDURE — 84443 ASSAY THYROID STIM HORMONE: CPT | Performed by: INTERNAL MEDICINE

## 2022-02-10 PROCEDURE — 84480 ASSAY TRIIODOTHYRONINE (T3): CPT | Performed by: INTERNAL MEDICINE

## 2022-02-11 ENCOUNTER — PATIENT MESSAGE (OUTPATIENT)
Dept: ENDOCRINOLOGY | Facility: CLINIC | Age: 27
End: 2022-02-11
Payer: COMMERCIAL

## 2022-02-11 DIAGNOSIS — E05.00 GRAVES' DISEASE: Primary | ICD-10-CM

## 2022-02-12 LAB — TSH RECEP AB SER-ACNC: 9.69 IU/L (ref 0–1.75)

## 2022-02-14 LAB — 1,25(OH)2D3 SERPL-MCNC: <5 PG/ML (ref 20–79)

## 2022-03-02 ENCOUNTER — LAB VISIT (OUTPATIENT)
Dept: LAB | Facility: HOSPITAL | Age: 27
End: 2022-03-02
Attending: INTERNAL MEDICINE
Payer: COMMERCIAL

## 2022-03-02 DIAGNOSIS — E05.00 GRAVES' DISEASE: ICD-10-CM

## 2022-03-02 LAB
ALBUMIN SERPL BCP-MCNC: 3.9 G/DL (ref 3.5–5.2)
ALP SERPL-CCNC: 99 U/L (ref 55–135)
ALT SERPL W/O P-5'-P-CCNC: 46 U/L (ref 10–44)
ANION GAP SERPL CALC-SCNC: 11 MMOL/L (ref 8–16)
AST SERPL-CCNC: 70 U/L (ref 10–40)
BILIRUB SERPL-MCNC: 0.5 MG/DL (ref 0.1–1)
BUN SERPL-MCNC: 9 MG/DL (ref 6–20)
CALCIUM SERPL-MCNC: 10.3 MG/DL (ref 8.7–10.5)
CHLORIDE SERPL-SCNC: 103 MMOL/L (ref 95–110)
CO2 SERPL-SCNC: 23 MMOL/L (ref 23–29)
CREAT SERPL-MCNC: 0.6 MG/DL (ref 0.5–1.4)
EST. GFR  (AFRICAN AMERICAN): >60 ML/MIN/1.73 M^2
EST. GFR  (NON AFRICAN AMERICAN): >60 ML/MIN/1.73 M^2
GLUCOSE SERPL-MCNC: 109 MG/DL (ref 70–110)
POTASSIUM SERPL-SCNC: 3.8 MMOL/L (ref 3.5–5.1)
PROT SERPL-MCNC: 7.1 G/DL (ref 6–8.4)
SODIUM SERPL-SCNC: 137 MMOL/L (ref 136–145)
T3 SERPL-MCNC: 236 NG/DL (ref 60–180)
T4 FREE SERPL-MCNC: 1.43 NG/DL (ref 0.71–1.51)
TSH SERPL DL<=0.005 MIU/L-ACNC: <0.01 UIU/ML (ref 0.4–4)

## 2022-03-02 PROCEDURE — 84439 ASSAY OF FREE THYROXINE: CPT | Performed by: INTERNAL MEDICINE

## 2022-03-02 PROCEDURE — 84480 ASSAY TRIIODOTHYRONINE (T3): CPT | Performed by: INTERNAL MEDICINE

## 2022-03-02 PROCEDURE — 36415 COLL VENOUS BLD VENIPUNCTURE: CPT | Mod: PN | Performed by: INTERNAL MEDICINE

## 2022-03-02 PROCEDURE — 80053 COMPREHEN METABOLIC PANEL: CPT | Performed by: INTERNAL MEDICINE

## 2022-03-02 PROCEDURE — 84443 ASSAY THYROID STIM HORMONE: CPT | Performed by: INTERNAL MEDICINE

## 2022-03-04 ENCOUNTER — PATIENT MESSAGE (OUTPATIENT)
Dept: ENDOCRINOLOGY | Facility: CLINIC | Age: 27
End: 2022-03-04
Payer: COMMERCIAL

## 2022-03-09 ENCOUNTER — OFFICE VISIT (OUTPATIENT)
Dept: ENDOCRINOLOGY | Facility: CLINIC | Age: 27
End: 2022-03-09
Payer: COMMERCIAL

## 2022-03-09 DIAGNOSIS — R74.8 ABNORMAL LIVER ENZYMES: ICD-10-CM

## 2022-03-09 DIAGNOSIS — E83.52 HYPERCALCEMIA: ICD-10-CM

## 2022-03-09 DIAGNOSIS — E05.00 GRAVES' EYE DISEASE: ICD-10-CM

## 2022-03-09 DIAGNOSIS — L50.9 HIVES: ICD-10-CM

## 2022-03-09 DIAGNOSIS — E05.00 GRAVES' DISEASE: Primary | ICD-10-CM

## 2022-03-09 PROCEDURE — 1159F MED LIST DOCD IN RCRD: CPT | Mod: CPTII,95,, | Performed by: INTERNAL MEDICINE

## 2022-03-09 PROCEDURE — 1160F PR REVIEW ALL MEDS BY PRESCRIBER/CLIN PHARMACIST DOCUMENTED: ICD-10-PCS | Mod: CPTII,95,, | Performed by: INTERNAL MEDICINE

## 2022-03-09 PROCEDURE — 1159F PR MEDICATION LIST DOCUMENTED IN MEDICAL RECORD: ICD-10-PCS | Mod: CPTII,95,, | Performed by: INTERNAL MEDICINE

## 2022-03-09 PROCEDURE — 99214 PR OFFICE/OUTPT VISIT, EST, LEVL IV, 30-39 MIN: ICD-10-PCS | Mod: 95,,, | Performed by: INTERNAL MEDICINE

## 2022-03-09 PROCEDURE — 99214 OFFICE O/P EST MOD 30 MIN: CPT | Mod: 95,,, | Performed by: INTERNAL MEDICINE

## 2022-03-09 PROCEDURE — 1160F RVW MEDS BY RX/DR IN RCRD: CPT | Mod: CPTII,95,, | Performed by: INTERNAL MEDICINE

## 2022-03-09 RX ORDER — METHIMAZOLE 10 MG/1
20 TABLET ORAL DAILY
Qty: 180 TABLET | Refills: 3 | Status: SHIPPED | OUTPATIENT
Start: 2022-03-09 | End: 2022-06-10 | Stop reason: SDUPTHER

## 2022-03-09 NOTE — PATIENT INSTRUCTIONS
Thank you for completing a virtual visit with me!     Per our conversation :  Continue the methimazole 20 mg a day.  I will send in a new prescription to the pharmacy on file.  Let me know if the hives return so I can put in a referral to Allergy  I will put in the referral for a formal eye exam.  We will recheck labs in 1 month  5.  We will plan a telemedicine or an in-clinic visit in 3 months with labs prior to that appointment.    My staff will contact you to schedule the above.     Please let me know if you have any other questions.    Thank you,  Rosey Ro MD

## 2022-03-09 NOTE — PROGRESS NOTES
Subjective:      Patient ID: Mandy Ayon is a 26 y.o. female.    Chief Complaint:  hyperthyroidism    History of Present Illness  With regards to her hyperthyroidism:    was found to have hyperthyroidism on labs  Lab Results   Component Value Date    TSH <0.010 (L) 03/02/2022         presented with fatigue and palpitations - since nov/dec   Weight loss    Etiology likely  Graves     Thyroid scan: none     Thyroid ultrasound: none     Thyroid ab done:none     Treatment:none     I131 therapy  none      Current hyperthyroid medication:   methimazole 20 mg qd - started feb 2022        On atenolol and is feeling better     current symptoms:   No palpations  weight loss has stabilized   Less hungry    Nl bm  No Hair loss  No Brittle nails     No tremor   No  anxiety    Insertion of Nexplanon  2/8/22    Denies new eye symptoms:    Is tolerating selenium     On Aldactone for acne    Works for [employer]     Did have hives end of feb  - saw derm     Taking benadryl and zyrtec --None now       She was noted to have non PTH mediated hypercalcemia on labs which has now resolved.      Also she was noted to have abnormal liver enzymes which have been stable.  ROS:   As above    Objective:     LMP 02/08/2022 (Exact Date)   BP Readings from Last 3 Encounters:   02/09/22 105/70   02/03/22 124/76   01/12/22 122/70     Wt Readings from Last 1 Encounters:   02/09/22 0929 72.8 kg (160 lb 9.7 oz)     There is no height or weight on file to calculate BMI.      Physical Exam  Psychiatric:         Mood and Affect: Mood normal.         Thought Content: Thought content normal.         Judgment: Judgment normal.              Lab Review:   No results found for: HGBA1C  No results found for: CHOL, HDL, LDLCALC, TRIG, CHOLHDL  Lab Results   Component Value Date     03/02/2022    K 3.8 03/02/2022     03/02/2022    CO2 23 03/02/2022     03/02/2022    BUN 9 03/02/2022    CREATININE 0.6 03/02/2022    CALCIUM 10.3  03/02/2022    PROT 7.1 03/02/2022    ALBUMIN 3.9 03/02/2022    BILITOT 0.5 03/02/2022    ALKPHOS 99 03/02/2022    AST 70 (H) 03/02/2022    ALT 46 (H) 03/02/2022    ANIONGAP 11 03/02/2022    ESTGFRAFRICA >60.0 03/02/2022    EGFRNONAA >60.0 03/02/2022    TSH <0.010 (L) 03/02/2022     No results found for: [id number]    Assessment and Plan     Graves' disease  reviewed treatment options of I131, ATD or surgery, surgery being least desirable.    Discussed risks of worsening GO with I131  Review the connection of GO with tob use    Discussed the chance of Graves remission (30%) after 2 years of ATD therapy - with a 50% chance of recurrence    Continue  methimazole 20 mg qd     Call if fever, sore throat, rash, anorexia, nausea or vomiitng.  Reviewed side effects of ATDs are rare (agranulocytosis and liver failure) but can be very serious.     Labs in 4 weeks  Visit in 3 months   Refer to ophtho           Reviewed that Selenium supplementation may decrease inflammatory activity in patients with autoimmune thyroiditis,  and may improve symptoms in patients with mild Graves ophthalmopathy   Dose is 100 mcg twice daily.          Graves' eye disease  As above   selenium     Abnormal liver enzymes   follow   suspect due to graves   If they do not start to normalize will refer to hepatology    Hives  Advised her to stop the Benadryl  She will let me know if they come back and I will put in a referral to Allergy.      Noted that persistent urticaria can be associated with thyroid disease.    The patient location is: work  The chief complaint leading to consultation is: thyroid    Visit type: audiovisual    Face to Face time with patient: 25 minutes of total time spent on the encounter, which includes face to face time and non-face to face time preparing to see the patient (eg, review of tests), Obtaining and/or reviewing separately obtained history, Documenting clinical information in the electronic or other health record,  Independently interpreting results (not separately reported) and communicating results to the patient/family/caregiver, or Care coordination (not separately reported).         Each patient to whom he or she provides medical services by telemedicine is:  (1) informed of the relationship between the physician and patient and the respective role of any other health care provider with respect to management of the patient; and (2) notified that he or she may decline to receive medical services by telemedicine and may withdraw from such care at any time.

## 2022-03-09 NOTE — ASSESSMENT & PLAN NOTE
reviewed treatment options of I131, ATD or surgery, surgery being least desirable.    Discussed risks of worsening GO with I131  Review the connection of GO with tob use    Discussed the chance of Graves remission (30%) after 2 years of ATD therapy - with a 50% chance of recurrence    Continue  methimazole 20 mg qd     Call if fever, sore throat, rash, anorexia, nausea or vomiitng.  Reviewed side effects of ATDs are rare (agranulocytosis and liver failure) but can be very serious.     Labs in 4 weeks  Visit in 3 months   Refer to ophtho           Reviewed that Selenium supplementation may decrease inflammatory activity in patients with autoimmune thyroiditis,  and may improve symptoms in patients with mild Graves ophthalmopathy   Dose is 100 mcg twice daily.

## 2022-03-09 NOTE — ASSESSMENT & PLAN NOTE
Advised her to stop the Benadryl  She will let me know if they come back and I will put in a referral to Allergy.      Noted that persistent urticaria can be associated with thyroid disease.

## 2022-03-10 ENCOUNTER — OFFICE VISIT (OUTPATIENT)
Dept: PRIMARY CARE CLINIC | Facility: CLINIC | Age: 27
End: 2022-03-10
Payer: COMMERCIAL

## 2022-03-10 ENCOUNTER — TELEPHONE (OUTPATIENT)
Dept: BEHAVIORAL HEALTH | Facility: CLINIC | Age: 27
End: 2022-03-10
Payer: COMMERCIAL

## 2022-03-10 VITALS
SYSTOLIC BLOOD PRESSURE: 118 MMHG | HEIGHT: 69 IN | HEART RATE: 76 BPM | BODY MASS INDEX: 24.2 KG/M2 | OXYGEN SATURATION: 98 % | WEIGHT: 163.38 LBS | TEMPERATURE: 98 F | RESPIRATION RATE: 18 BRPM | DIASTOLIC BLOOD PRESSURE: 72 MMHG

## 2022-03-10 DIAGNOSIS — E05.00 GRAVES' EYE DISEASE: ICD-10-CM

## 2022-03-10 DIAGNOSIS — F41.9 ANXIETY: ICD-10-CM

## 2022-03-10 DIAGNOSIS — R74.8 ABNORMAL LIVER ENZYMES: ICD-10-CM

## 2022-03-10 DIAGNOSIS — E05.00 GRAVES' DISEASE: Primary | ICD-10-CM

## 2022-03-10 PROCEDURE — 1160F PR REVIEW ALL MEDS BY PRESCRIBER/CLIN PHARMACIST DOCUMENTED: ICD-10-PCS | Mod: CPTII,S$GLB,, | Performed by: FAMILY MEDICINE

## 2022-03-10 PROCEDURE — 3074F PR MOST RECENT SYSTOLIC BLOOD PRESSURE < 130 MM HG: ICD-10-PCS | Mod: CPTII,S$GLB,, | Performed by: FAMILY MEDICINE

## 2022-03-10 PROCEDURE — 99999 PR PBB SHADOW E&M-EST. PATIENT-LVL V: ICD-10-PCS | Mod: PBBFAC,,, | Performed by: FAMILY MEDICINE

## 2022-03-10 PROCEDURE — 1159F MED LIST DOCD IN RCRD: CPT | Mod: CPTII,S$GLB,, | Performed by: FAMILY MEDICINE

## 2022-03-10 PROCEDURE — 3074F SYST BP LT 130 MM HG: CPT | Mod: CPTII,S$GLB,, | Performed by: FAMILY MEDICINE

## 2022-03-10 PROCEDURE — 1160F RVW MEDS BY RX/DR IN RCRD: CPT | Mod: CPTII,S$GLB,, | Performed by: FAMILY MEDICINE

## 2022-03-10 PROCEDURE — 3008F PR BODY MASS INDEX (BMI) DOCUMENTED: ICD-10-PCS | Mod: CPTII,S$GLB,, | Performed by: FAMILY MEDICINE

## 2022-03-10 PROCEDURE — 3078F PR MOST RECENT DIASTOLIC BLOOD PRESSURE < 80 MM HG: ICD-10-PCS | Mod: CPTII,S$GLB,, | Performed by: FAMILY MEDICINE

## 2022-03-10 PROCEDURE — 1159F PR MEDICATION LIST DOCUMENTED IN MEDICAL RECORD: ICD-10-PCS | Mod: CPTII,S$GLB,, | Performed by: FAMILY MEDICINE

## 2022-03-10 PROCEDURE — 99214 OFFICE O/P EST MOD 30 MIN: CPT | Mod: S$GLB,,, | Performed by: FAMILY MEDICINE

## 2022-03-10 PROCEDURE — 99214 PR OFFICE/OUTPT VISIT, EST, LEVL IV, 30-39 MIN: ICD-10-PCS | Mod: S$GLB,,, | Performed by: FAMILY MEDICINE

## 2022-03-10 PROCEDURE — 99999 PR PBB SHADOW E&M-EST. PATIENT-LVL V: CPT | Mod: PBBFAC,,, | Performed by: FAMILY MEDICINE

## 2022-03-10 PROCEDURE — 3078F DIAST BP <80 MM HG: CPT | Mod: CPTII,S$GLB,, | Performed by: FAMILY MEDICINE

## 2022-03-10 PROCEDURE — 3008F BODY MASS INDEX DOCD: CPT | Mod: CPTII,S$GLB,, | Performed by: FAMILY MEDICINE

## 2022-03-10 RX ORDER — SELENIUM 100 MCG
1 TABLET ORAL 2 TIMES DAILY
Qty: 180 EACH | Refills: 0 | Status: SHIPPED | OUTPATIENT
Start: 2022-03-10 | End: 2023-03-23

## 2022-03-10 NOTE — PROGRESS NOTES
[facility] Note    Chief Complaint      Chief Complaint   Patient presents with    Follow-up       History of Present Illness      Mandy Ayon is a 26 y.o. female with chronic conditions of graves who presents today for:  followup after starting methimazole    Appetite calmed, less myalgia, less anxious, less palpitation, better stamina - can run again    Reports increasing anxiety over time not just regarding illness but in relationship with her parents and would like to go to therapy    She reports skin sensitivity and urticaria with any pressure/scratching. Itching has decreased overall with antihistamine    Patient Care Team:  Yolande Case MD as PCP - General (Internal Medicine)     Health Maintenance:  Immunization History   Administered Date(s) Administered    COVID-19, MRNA, LN-S, PF (Pfizer) (Purple Cap) 03/16/2021, 04/06/2021    DTaP 02/28/1996, 04/24/1996, 06/17/1996, 02/27/1998, 08/21/2000    HIB 02/28/1996, 04/24/1996, 06/17/1996, 08/14/1997    HPV Quadrivalent 05/16/2009, 09/21/2009, 12/21/2009    Hepatitis B, Pediatric/Adolescent 1995, 01/17/1996, 09/27/1996    IPV 08/21/2000    Influenza - Quadrivalent 01/29/2020    Influenza A (H1N1) 2009 Monovalent - IM 12/21/2009    MMR 03/14/1997, 08/21/2000    Meningococcal Conjugate (MCV4P) 09/21/2009, 08/09/2014    OPV 02/28/1996, 04/28/1996, 02/27/1998    Tdap 05/30/2007, 08/29/2018      Health Maintenance   Topic Date Due    Lipid Panel  Never done    Pap Smear  01/12/2025    TETANUS VACCINE  08/29/2028    Hepatitis C Screening  Completed    HPV Vaccines  Completed        Past Medical History:  Past Medical History:   Diagnosis Date    Hyperthyroidism 02/06/2022       Past Surgical History:   has a past surgical history that includes none.    Family History:  family history includes Diabetes in her maternal grandmother.     Social History:  Social History     Tobacco Use    Smoking status: Never Smoker     "Smokeless tobacco: Never Used   Substance Use Topics    Alcohol use: Yes     Comment: weekly    Drug use: Never       Review of Systems   Constitutional: Negative for fever.   Respiratory: Negative for shortness of breath.    Cardiovascular: Negative for chest pain.   Gastrointestinal: Negative for change in bowel habit and change in bowel habit.   Genitourinary: Negative for difficulty urinating.        Medications:    Current Outpatient Medications:     atenoloL (TENORMIN) 25 MG tablet, Take 1 tablet (25 mg total) by mouth once daily., Disp: 90 tablet, Rfl: 0    ibuprofen (ADVIL,MOTRIN) 200 MG tablet, Take 200 mg by mouth every 6 (six) hours as needed for Pain., Disp: , Rfl:     methIMAzole (TAPAZOLE) 10 MG Tab, Take 2 tablets (20 mg total) by mouth once daily., Disp: 180 tablet, Rfl: 3    spironolactone (ALDACTONE) 100 MG tablet, Take 100 mg by mouth once daily., Disp: , Rfl:     selenium 100 mcg Tab, Take 1 tablet by mouth 2 (two) times a day., Disp: 180 each, Rfl: 0    Current Facility-Administered Medications:     etonogestreL subdermal device 68 mg, 68 mg, Implant, , Chacha Hagan PA-C, 68 mg at 02/08/22 0920     Allergies:  Review of patient's allergies indicates:  No Known Allergies    Physical Exam      Vital Signs  Temp: 98 °F (36.7 °C)  Pulse: 76  Resp: 18  SpO2: 98 %  BP: 118/72  BP Location: Right arm  Patient Position: Sitting  Pain Score: 0-No pain  Height and Weight  Height: 5' 9" (175.3 cm)  Weight: 74.1 kg (163 lb 5.8 oz)  BSA (Calculated - sq m): 1.9 sq meters  BMI (Calculated): 24.1  Weight in (lb) to have BMI = 25: 168.9      Patient Position: Sitting      Physical Exam  Vitals reviewed.   Constitutional:       General: She is not in acute distress.     Appearance: Normal appearance.   HENT:      Right Ear: External ear normal.      Left Ear: External ear normal.   Eyes:      Conjunctiva/sclera: Conjunctivae normal.      Comments: Decrease in exophthalmos (lids now cover inferior " portion of iris and approach superior)   Neck:      Comments: Decrease in thyromegaly from prior  Cardiovascular:      Rate and Rhythm: Normal rate and regular rhythm.      Heart sounds: No murmur heard.    No friction rub. No gallop.   Pulmonary:      Effort: Pulmonary effort is normal.      Breath sounds: No wheezing, rhonchi or rales.   Musculoskeletal:      Cervical back: Neck supple.      Right lower leg: No edema.      Left lower leg: No edema.   Skin:     General: Skin is warm and dry.      Comments: Urticarial excoriation to left shoulder.    Neurological:      General: No focal deficit present.      Mental Status: She is alert.   Psychiatric:         Mood and Affect: Mood normal.         Behavior: Behavior normal.          Laboratory:  CBC:  Recent Labs   Lab 02/03/22 1218   WBC 7.09   RBC 4.70   Hemoglobin 12.4   Hematocrit 40.1   Platelets 313   MCV 85   MCH 26.4 L   MCHC 30.9 L       CMP:  Recent Labs   Lab 02/03/22  1218 02/10/22  0851 03/02/22  1010   Glucose 75   < > 109   Calcium 12.0 H   < > 10.3   Albumin 4.1   < > 3.9   Total Protein 7.6  --  7.1   Sodium 138   < > 137   Potassium 4.2   < > 3.8   CO2 24   < > 23   Chloride 103   < > 103   BUN 10   < > 9   Creatinine 0.6   < > 0.6   Alkaline Phosphatase 99  --  99   ALT 52 H  --  46 H   AST 71 H  --  70 H   Total Bilirubin 0.7  --  0.5    < > = values in this interval not displayed.           URINALYSIS:         LIPIDS:  Recent Labs   Lab 02/03/22  1218 02/10/22  0851 03/02/22  1010   TSH <0.010 L <0.010 L <0.010 L       TSH:  Recent Labs   Lab 02/03/22  1218 02/10/22  0851 03/02/22  1010   TSH <0.010 L <0.010 L <0.010 L       A1C:        Urine Microalbumin/Cr:          Other:       Recent Labs   Lab 02/03/22 1218   Hepatitis C Ab Negative       Assessment/Plan     Mandy Ayon is a 26 y.o.female with:    Graves' disease  Methimazole, selenium, repeat labs, followed by endocrine  Graves' eye disease  Refer to ophthalmology  Abnormal liver  enzymes  -     Hepatitis Panel, Acute; Future; Expected date: 03/10/2022  Add hepatitis panel to scheduled labs and get US if LFTs remain high  Anxiety  -     Ambulatory referral/consult to Primary Care Behavioral Health (Non-Opioids); Future; Expected date: 03/17/2022    Other orders  -     selenium 100 mcg Tab; Take 1 tablet by mouth 2 (two) times a day.  Dispense: 180 each; Refill: 0         Chronic conditions status updated as per HPI.  Other than changes above, cont current medications and maintain follow up with specialists.  Return to clinic in Follow up in about 3 months (around 6/10/2022), or if symptoms worsen or fail to improve, for recheck and ensure adequate followup.      Yolande Case MD  [facility]

## 2022-03-10 NOTE — PROGRESS NOTES
Behavioral Health Community Health Worker  Initial Assessment  Completed by:  Kirti Vickers    Date:  3/10/2022    Patient Enrollment in Behavioral Health Program:  · Patient verbalized understanding of Behavioral Health Integration services to include:  · Patient understands that CHW, LCSW, PharmD and consulting Psychiatrist are members of the care team working collaboratively with his/her primary care provider: Yes  · Patient understands that activation of their [facility] patient portal account is required for accessing the full scope of team services: Yes  · Patient understands that some counseling sessions may occur via video: Yes  · Clinic visits with the psychiatrist may be subject to a co-pay based on your insurance: Yes  · Patient consents to enroll in BHI program: Yes    Assessments     Single Item Health Literacy Scale:  · How often do you need to have someone help you read instructions, pamphlets or other written material from your doctor or pharmacy?: Never    Promis 10:  · Promis 10 Responses  · In general, would you say your health is: Fair  · In general, would you say your quality of life is: Good  · In general, how would you rate your physical health?: Fair  · In general, how would you rate your mental health, including your mood and your ability to think?: Good  · In general, how would you rate your satisfaction with your social activities and relationships?: Good  · In general, please rate how well you carry out your usual social activities and roles. (This includes activities at home, at work and in your community, and responsibilities as a parent, child, spouse, employee, friend, etc.): Good  · To what extent are you able to carry out your everyday physical activities such as walking, climbing stairs, carrying groceries, or moving a chair? : Completely  · How often have you been bothered by emotional problems such as feeling anxious, depressed or irritable?: Often  · In the past 7 days, how would  "you rate your fatigue on average?: None  · In the past 7 days, on a scale of 0 to 10 (where 0 is no pain and 10 is the worst pain imaginable) how would you rate your pain on average?: 1  · Global Physical Health: 9  · Global Mental health Score: 13    Depression PHQ:  PHQ9 3/10/2022   Total Score 5         Generalized Anxiety Disorder 7-Item Scale:  GAD7 3/10/2022   1. Feeling nervous, anxious, or on edge? 3   2. Not being able to stop or control worrying? 0   3. Worrying too much about different things? 0   4. Trouble relaxing? 0   5. Being so restless that it is hard to sit still? 0   6. Becoming easily annoyed or irritable? 1   7. Feeling afraid as if something awful might happen? 1   8. If you checked off any problems, how difficult have these problems made it for you to do your work, take care of things at home, or get along with other people? 1   ILENE-7 Score 5       History     Social History     Socioeconomic History    Marital status: Single   Tobacco Use    Smoking status: Never Smoker    Smokeless tobacco: Never Used   Substance and Sexual Activity    Alcohol use: Yes     Comment: weekly    Drug use: Never    Sexual activity: Yes     Partners: Male     Birth control/protection: Condom       Call Summary     Patient was referred to the BHI (Non-opioid) program by Primary Care Provider, Dr. Yolande Case.  CHW contacted Mandy Ayon who reports depression and anxiety that limits her activities of daily living (ADLs).   Patient scored "5" on the PHQ9 and "5" on the ILENE 7. Based on these scores patient is eligible for the Behavioral Health Integration (Non-opioid) Program. CHW completed the intake and scheduled an office appointment for patient with Emilia Botello LCSW, on 03/17/22 at 4:00pm.         "

## 2022-03-10 NOTE — PATIENT INSTRUCTIONS
Get labs to recheck liver and thyroid in 1 month.  Decrease alcohol intake during this time and I will add hepatitis testing to these labs.  If they remain high we will order ultrasound.  For hives, etiology unknown.  Thyroid vs methimazole vs nexplanon.  Start selenium, take zyrtec and use benadryl as needed and we will monitor hives as thyroid corrects. Selenium is 100mcg twice a day.  Make appointment with ophthalmology  Make appointment with behavioral health

## 2022-03-16 ENCOUNTER — TELEPHONE (OUTPATIENT)
Dept: BEHAVIORAL HEALTH | Facility: CLINIC | Age: 27
End: 2022-03-16
Payer: COMMERCIAL

## 2022-03-16 NOTE — PROGRESS NOTES
CHW reached out to pt to remind her of office appointment with Emilia Botello LCSW, on tomorrow. Left VM of the appointment.

## 2022-03-17 ENCOUNTER — CLINICAL SUPPORT (OUTPATIENT)
Dept: BEHAVIORAL HEALTH | Facility: CLINIC | Age: 27
End: 2022-03-17
Payer: COMMERCIAL

## 2022-03-17 DIAGNOSIS — F43.21 ADJUSTMENT DISORDER WITH DEPRESSED MOOD: ICD-10-CM

## 2022-03-17 DIAGNOSIS — F41.9 ANXIETY: ICD-10-CM

## 2022-03-17 PROCEDURE — 99999 PR PBB SHADOW E&M-EST. PATIENT-LVL I: ICD-10-PCS | Mod: PBBFAC,,, | Performed by: SOCIAL WORKER

## 2022-03-17 PROCEDURE — 90791 PR PSYCHIATRIC DIAGNOSTIC EVALUATION: ICD-10-PCS | Mod: S$GLB,,, | Performed by: SOCIAL WORKER

## 2022-03-17 PROCEDURE — 99999 PR PBB SHADOW E&M-EST. PATIENT-LVL I: CPT | Mod: PBBFAC,,, | Performed by: SOCIAL WORKER

## 2022-03-17 PROCEDURE — 90791 PSYCH DIAGNOSTIC EVALUATION: CPT | Mod: S$GLB,,, | Performed by: SOCIAL WORKER

## 2022-03-17 NOTE — PROGRESS NOTES
"[facility] BEHAVIORAL HEALTH INTEGRATION INTAKE    DATE:  3/23/2022  REFERRAL SOURCE:  Yolande Case MD  TYPE OF VISIT:  In person  LENGTH OF SESSION: 60  .  HISTORY OF PRESENTING ILLNESS:  Mandy Ayon, a 26 y.o. female with history of Adjustment disorders; with depressed mood [F43.21].  Met with patient. Pt reports being here to "check out therapy." Pt reports that has several friends in therapy,  they have a lot of issues. Pt reports parents are going through a  messy divorce, pt works for a [employer] company, new dx of Graves diease, however no trauma.     Pt reports mom recently signed the divorce papers, mom hurting. Dad is dating. Mom was upset that dad's GF was wearing an old costume of pt's for [employer]. Pt is uncertain as to her feelings on the matter.      Pt believes it is important to learn why pt is doing  the things the pt does. Pt admits pt is not always open to expressing love.     Pt describes possible dx of seasonal affective disorder. Sad, withdrawn, in the fall.      Pt describes self as always a peace maker     Discussed using breathing 123 exercise.   Discussed BHI program, Pt is short term therapy (3-6 months) with initial appointments being approximately 60 minutes and follow up appointments approximately 30 minutes, usually every 2-3 weeks. This program is solution focused and goal oriented. The goal of the program is to reduce symptoms and create a "tool box" of resources to help the pt control, reduce, or eliminate symptoms. [facility] works with the PCP and psychiatrist as a team to help the pt achieve the best results.     Patient does not currently have a psychiatrist.    Patient does not currently have a therapist.     Previous Psychiatric Outpatient Treatment:  No     Current symptoms:  · Depression: denies.  · Anxiety: panic attacks.  · Insomnia: reports sleep issues in fall .  · Maribeth:  racing thoughts or rumination.  · Psychosis: denies .    PHQ9 3/10/2022   Total Score 5     GAD7 " 3/10/2022   1. Feeling nervous, anxious, or on edge? 3   2. Not being able to stop or control worrying? 0   3. Worrying too much about different things? 0   4. Trouble relaxing? 0   5. Being so restless that it is hard to sit still? 0   6. Becoming easily annoyed or irritable? 1   7. Feeling afraid as if something awful might happen? 1   8. If you checked off any problems, how difficult have these problems made it for you to do your work, take care of things at home, or get along with other people? 1   ILENE-7 Score 5        Current social stressors:   Job  Parents divorce  DX of Graves Disease      Risk assessment:  Patient reports no suicidal ideation  Patient reports no homicidal ideation  Patient reports no self-injurious behavior  Patient reports no violent behavior    PSYCHIATRIC HISTORY:  Previous Psychiatric Hospitalizations:  No  Previous SI/HI:   No  Previous Suicide Attempts:  No  Previous Medication Trials: No   Head trauma:  second grade truck rolled over head, skull was fine,   History of Trauma:  last November boss collapsed event and stopped breathing. Knew for 10 yrs, . Sorrow, cried at , sad for the family, thought should be sadder  Legal Issues: No  Access to a Gun:  No    SUBSTANCE ABUSE HISTORY:  Tobacco:  No   Alcohol: social  Illicit Substances: No  Misuse of Prescription Medications:  No    MEDICAL HISTORY:  Past Medical History:   Diagnosis Date    Hyperthyroidism 2022       SOCIAL HISTORY (MARRIAGE, EMPLOYMENT, etc.):  Living Situation: room mate, dolores 26 yo ( does things weird)   Family of Origin 2 older 2 brothers, 4 maternal cousins, mom stayed home, until HS, don't get along with brothers, 31 and 30, mom cooked, positive  Alissa was bad for brothers, dad strict,   Nuclear/Marriage: not  no kids  Supports: mom and friends, [place],   Education/Vocation: College econo and marketing, [employer]  Holiness/Spirituality: raised Lutheran  Hobbies and Interests: cats,  ultimate Frisbee, reading, sailing, running, hiking, camping    PSYCHIATRIC FAMILY HISTORY: none      MENTAL HEALTH STATUS EXAM  General Appearance:  unremarkable, age appropriate   Speech: normal tone, normal rate, normal pitch, normal volume      Level of Cooperation: guarded      Thought Processes: normal and logical   Mood: steady      Thought Content: normal, no suicidality, no homicidality, delusions, or paranoia   Affect: appropriate   Orientation: Oriented x3   Memory: not assessed   Attention Span & Concentration: intact   Fund of General Knowledge: intact and appropriate to age and level of education   Abstract Reasoning: not assessed   Judgment & Insight: good     Language  intact       IMPRESSION:   My diagnostic impression is Adjustment disorders; with depressed mood [F43.21], as evidenced by recent dx of graves disease, parents going through messy divorce, seasonal symptoms of depression..     PROVISIONAL DIAGNOSES:  1. Anxiety    2. Adjustment disorder with depressed mood         STRENGTHS AND LIABILITIES: Strength: Patient is intelligent., Liability: Patient lacks coping skills.    TREATMENT GOALS: Depression: increasing energy and increasing interest in usual activities    PLAN: In this session a psych evaluation was conducted to get history and process pt's life. Solution-focused Therapy and Relaxation Techniques  will be utilized in future individual  therapy sessions to increase insight, support and behavior modification.     RETURN TO CLINIC: No follow-ups on file. April 7th

## 2022-03-23 ENCOUNTER — PATIENT MESSAGE (OUTPATIENT)
Dept: BEHAVIORAL HEALTH | Facility: CLINIC | Age: 27
End: 2022-03-23
Payer: COMMERCIAL

## 2022-03-23 PROBLEM — F43.21 ADJUSTMENT DISORDER WITH DEPRESSED MOOD: Status: ACTIVE | Noted: 2022-03-23

## 2022-04-06 ENCOUNTER — PATIENT MESSAGE (OUTPATIENT)
Dept: BEHAVIORAL HEALTH | Facility: CLINIC | Age: 27
End: 2022-04-06
Payer: COMMERCIAL

## 2022-04-06 ENCOUNTER — TELEPHONE (OUTPATIENT)
Dept: BEHAVIORAL HEALTH | Facility: CLINIC | Age: 27
End: 2022-04-06
Payer: COMMERCIAL

## 2022-04-06 NOTE — PROGRESS NOTES
CHW reached out to pt to remind her of office appointment with Emilia Botello LCSW, on tomorrow. No answer, left VM, sent assessments and reminder to pt's MyChart.

## 2022-04-07 ENCOUNTER — TELEPHONE (OUTPATIENT)
Dept: BEHAVIORAL HEALTH | Facility: CLINIC | Age: 27
End: 2022-04-07
Payer: COMMERCIAL

## 2022-04-07 ENCOUNTER — CLINICAL SUPPORT (OUTPATIENT)
Dept: BEHAVIORAL HEALTH | Facility: CLINIC | Age: 27
End: 2022-04-07
Payer: COMMERCIAL

## 2022-04-07 DIAGNOSIS — F43.21 ADJUSTMENT DISORDER WITH DEPRESSED MOOD: Primary | ICD-10-CM

## 2022-04-07 PROCEDURE — 90832 PSYTX W PT 30 MINUTES: CPT | Mod: S$GLB,,, | Performed by: SOCIAL WORKER

## 2022-04-07 PROCEDURE — 90832 PR PSYCHOTHERAPY W/PATIENT, 30 MIN: ICD-10-PCS | Mod: S$GLB,,, | Performed by: SOCIAL WORKER

## 2022-04-07 NOTE — PROGRESS NOTES
Individual Psychotherapy (LCSW/PhD)  Mandy Blancoamelia,  4/7/2022    Site:  [place]         Therapeutic Intervention: Met with patient for individual psychotherapy.    Chief complaint/reason for encounter: depression and anxiety     Interval history and content of current session:     Pt reports had lunch with dad. Dad did not bring up mom, did bring GF up. Brother was there, Mom hasn't brought up divorce or GF, a house comment, mom stated she is sad. Mom is seeing therapist.    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 Does not want to move away from dealing with people. Looking at working remotely. Studied economics and marketing. No grad school. Small company feel loyalty to company. Pt is looking for new job, discussed [employer]? Discussed petal exercises, letters of recommendations.     Goals- Breathing exercises, petal exercise    Treatment plan:  · Target symptoms: depression, anxiety   · Why chosen therapy is appropriate versus another modality: patient responds to this modality  · Outcome monitoring methods: self-report, checklist/rating scale  · Therapeutic intervention type: behavior modifying psychotherapy    Risk parameters:  Patient reports no suicidal ideation  Patient reports no homicidal ideation  Patient reports no self-injurious behavior  Patient reports no violent behavior    Verbal deficits: None    Patient's response to intervention:  The patient's response to intervention is guarded.    Progress toward goals and other mental status changes:  The patient's progress toward goals is fair .    Diagnosis:     ICD-10-CM ICD-9-CM   1. Adjustment disorder with depressed mood  F43.21 309.0       Plan: Pt plans to continue individual psychotherapy    Return to clinic: 3 weeks, 4/28 @ 4 pm    Length of Service (minutes): 30

## 2022-04-07 NOTE — PROGRESS NOTES
Behavioral Health Community Health Worker  Follow-Up  Completed by:  Kirti Vickers    Date:  4/7/2022    Patient Enrollment in Behavioral Health Program:  · Mandy Ayon was enrolled in the Behavioral Health Program on 03/10/2022    Assessments     Promis 10:  PROMIS-10 Questionnaire Scores 4/7/2022   Global Physical Health 10   Global Mental health Score 14       Depression PHQ:  PHQ9 4/7/2022   Little interest or pleasure in doing things: Not at all   Feeling down, depressed or hopeless: Not at all   Trouble falling asleep, staying asleep, or sleeping too much: Not at all   Feeling tired or having little energy: Not at all   Poor appetite or overeating: Not at all   Feeling bad about yourself- or that you are a failure or have let yourself or family down Not at all   Trouble concentrating on things, such as reading the newspaper or watching television: Not at all   Moving or speaking so slowly that other people could have noticed. Or the opposite- being so fidgety or restless that you have been moving around a lot more than usual: Not at all   Thoughts that you would be better off dead or hurting yourself in some way: Not at all   If you indicated you have experienced any of the aforementioned problems, how difficult have these problems made it for you to do your work, take care of things at home or get along with other people? Not difficult at all   Total Score 0       Generalized Anxiety Disorder 7-Item Scale:  GAD7 4/7/2022   1. Feeling nervous, anxious, or on edge? 0   2. Not being able to stop or control worrying? 0   3. Worrying too much about different things? 0   4. Trouble relaxing? 0   5. Being so restless that it is hard to sit still? 0   6. Becoming easily annoyed or irritable? 0   7. Feeling afraid as if something awful might happen? 0   8. If you checked off any problems, how difficult have these problems made it for you to do your work, take care of things at home, or get along with other  "people? 0   ILENE-7 Score 0       Patients' Global Impression of Change (PGIC) Scale:  Since beginning treatment at this clinic, how would you describe the change (if any) in ACTIVITY LIMITATIONS, SYMPTOMS, EMOTIONS, and OVERALL QUALITY OF LIFE, related to your painful condition?  No Value exists for the : [facility]#03659      In a similar way, please check the number below that matches your degree of change since beginning care at this clinic (Much better (0) - Much Worse (10)): No Value exists for the : [facility]#[id number]        Much Better                                     No Change                                    Much Worse                        -----------------------------------------------------------------------------                        0       1       2       3       4       5       6       7      8       9      10                     Call Summary     Patient was referred to the BHI (Non-opioid) program by Primary Care Provider, Dr. Yolande Case.  W contacted Mandy Ayon who reports depression and anxiety.  Patient scored "0" on the PHQ9 and "0" on the ILENE 7. Based on these scores patient is eligible for the Behavioral Health Integration (Non-opioid) Program. W completed the follow up and scheduled an appointment for patient with Emilia Botello LCSW, previously scheduled on 04/07/22 at 4:30pm.         "

## 2022-04-13 ENCOUNTER — LAB VISIT (OUTPATIENT)
Dept: LAB | Facility: HOSPITAL | Age: 27
End: 2022-04-13
Attending: FAMILY MEDICINE
Payer: COMMERCIAL

## 2022-04-13 DIAGNOSIS — R74.8 ABNORMAL LIVER ENZYMES: ICD-10-CM

## 2022-04-13 PROCEDURE — 80074 ACUTE HEPATITIS PANEL: CPT | Performed by: FAMILY MEDICINE

## 2022-04-13 PROCEDURE — 36415 COLL VENOUS BLD VENIPUNCTURE: CPT | Mod: PN | Performed by: FAMILY MEDICINE

## 2022-04-14 LAB
HAV IGM SERPL QL IA: NEGATIVE
HBV CORE IGM SERPL QL IA: NEGATIVE
HBV SURFACE AG SERPL QL IA: NEGATIVE
HCV AB SERPL QL IA: NEGATIVE

## 2022-04-28 ENCOUNTER — CLINICAL SUPPORT (OUTPATIENT)
Dept: BEHAVIORAL HEALTH | Facility: CLINIC | Age: 27
End: 2022-04-28
Payer: COMMERCIAL

## 2022-04-28 DIAGNOSIS — F43.21 ADJUSTMENT DISORDER WITH DEPRESSED MOOD: Primary | ICD-10-CM

## 2022-04-28 PROCEDURE — 90832 PR PSYCHOTHERAPY W/PATIENT, 30 MIN: ICD-10-PCS | Mod: S$GLB,,, | Performed by: SOCIAL WORKER

## 2022-04-28 PROCEDURE — 90832 PSYTX W PT 30 MINUTES: CPT | Mod: S$GLB,,, | Performed by: SOCIAL WORKER

## 2022-04-28 NOTE — PROGRESS NOTES
Individual Psychotherapy (LCSW/PhD)  Mandy Ayon,  4/28/2022    Site:  [place]         Therapeutic Intervention: Met with patient for individual psychotherapy.    Chief complaint/reason for encounter: depression   Current Goals- Breathing exercises, petal exercise  Interval history and content of current session:   Over all this are good, more positive than negative.   Job not a negative than thought.   Has not started flower exercise.     Tried to go to [employer] game. Pt really wanted it, dad, br and br's GF were to get there early to try to get tickets. Could not get in. Walked to a bar to watch. BR and br's GF were drunk. Dad revealed that Dad's GF killed in accident when hit a poll.   A lot of family discord with brother and GF dogging family and talking about dad's t sex life. Pt felt the whole night was out of control.  Pt stated held it together very well, then brother threw a tantrum because br was ready to leave. Pt cried.  Discussed how to use the situation to evaluate how to handle situations with family next time.     Goals: What did I learn how effect things next time    Treatment plan:  · Target symptoms: depression  · Why chosen therapy is appropriate versus another modality: patient responds to this modality  · Outcome monitoring methods: self-report, checklist/rating scale  · Therapeutic intervention type: insight oriented psychotherapy, behavior modifying psychotherapy    Risk parameters:  Patient reports no suicidal ideation  Patient reports no homicidal ideation  Patient reports no self-injurious behavior  Patient reports no violent behavior    Verbal deficits: None    Patient's response to intervention:  The patient's response to intervention is guarded.    Progress toward goals and other mental status changes:  The patient's progress toward goals is fair .    Diagnosis:     ICD-10-CM ICD-9-CM   1. Adjustment disorder with depressed mood  F43.21 309.0       Plan: Pt plans to continue  individual psychotherapy    Return to clinic: 1 month, 5/18 @ 4:30    Length of Service (minutes): 30

## 2022-05-17 ENCOUNTER — TELEPHONE (OUTPATIENT)
Dept: BEHAVIORAL HEALTH | Facility: CLINIC | Age: 27
End: 2022-05-17
Payer: COMMERCIAL

## 2022-05-17 ENCOUNTER — PATIENT MESSAGE (OUTPATIENT)
Dept: BEHAVIORAL HEALTH | Facility: CLINIC | Age: 27
End: 2022-05-17
Payer: COMMERCIAL

## 2022-05-18 ENCOUNTER — CLINICAL SUPPORT (OUTPATIENT)
Dept: BEHAVIORAL HEALTH | Facility: CLINIC | Age: 27
End: 2022-05-18
Payer: COMMERCIAL

## 2022-05-18 DIAGNOSIS — F43.21 ADJUSTMENT DISORDER WITH DEPRESSED MOOD: Primary | ICD-10-CM

## 2022-05-18 PROCEDURE — 90832 PR PSYCHOTHERAPY W/PATIENT, 30 MIN: ICD-10-PCS | Mod: S$GLB,,, | Performed by: SOCIAL WORKER

## 2022-05-18 PROCEDURE — 90832 PSYTX W PT 30 MINUTES: CPT | Mod: S$GLB,,, | Performed by: SOCIAL WORKER

## 2022-05-18 NOTE — PROGRESS NOTES
"Individual Psychotherapy (LCSW/PhD)  Mandy Blancomaryamsourav,  5/18/2022    Site:  [place]         Therapeutic Intervention: Met with patient for individual psychotherapy.    Chief complaint/reason for encounter: depression   Current Goals: breathing and petal exercise  Interval history and content of current session:   Found self more anxious, pt reports holding on to things that would normally let go.      Pt reports not being as happy, not as neat, avoiding or not having the energy to do things like in the past. Pt reports being more frustrated and even cried at work last week, always something wrong. Pt feels more pressure, feels that efforts are "never enough." Pt feels being treated like a child. Pt reports feeling more and more pressure. Discussed creating an environment that is positive for mental health.      Pt reports that "opened resume," to start updating, pending situation with work. Pt worksheet was begun but not completed, pt will work on resume and worksheet.     Goals: work on resume, complete flower exercise    Treatment plan:  · Target symptoms: depression  · Why chosen therapy is appropriate versus another modality: relevant to diagnosis, patient responds to this modality  · Outcome monitoring methods: self-report  · Therapeutic intervention type: insight oriented psychotherapy, behavior modifying psychotherapy    Risk parameters:  Patient reports no suicidal ideation  Patient reports no homicidal ideation  Patient reports no self-injurious behavior  Patient reports no violent behavior    Verbal deficits: None    Patient's response to intervention:  The patient's response to intervention is guarded.    Progress toward goals and other mental status changes:  The patient's progress toward goals is fair .    Diagnosis:     ICD-10-CM ICD-9-CM   1. Adjustment disorder with depressed mood  F43.21 309.0       Plan: Pt plans to continue individual psychotherapy    Return to clinic: 2 weeks, 6/8 at 4 pm "     Length of Service (minutes): 30

## 2022-06-07 ENCOUNTER — PATIENT MESSAGE (OUTPATIENT)
Dept: BEHAVIORAL HEALTH | Facility: CLINIC | Age: 27
End: 2022-06-07
Payer: COMMERCIAL

## 2022-06-07 ENCOUNTER — TELEPHONE (OUTPATIENT)
Dept: BEHAVIORAL HEALTH | Facility: CLINIC | Age: 27
End: 2022-06-07
Payer: COMMERCIAL

## 2022-06-07 NOTE — PROGRESS NOTES
Behavioral Health Community Health Worker  Follow-Up  Completed by: Kirti Vickers     Date:  6/7/2022    Patient Enrollment in Behavioral Health Program:  · Mandy Ayon was enrolled in the Behavioral Health Program on 03/10/2022     Assessments     Promis 10:  PROMIS-10 Questionnaire Scores 4/7/2022   Global Physical Health 10   Global Mental health Score 14       Depression PHQ:  PHQ9 6/7/2022   Little interest or pleasure in doing things: Several days   Feeling down, depressed or hopeless: Several days   Trouble falling asleep, staying asleep, or sleeping too much: Several days   Feeling tired or having little energy: Several days   Poor appetite or overeating: Several days   Feeling bad about yourself- or that you are a failure or have let yourself or family down Not at all   Trouble concentrating on things, such as reading the newspaper or watching television: Not at all   Moving or speaking so slowly that other people could have noticed. Or the opposite- being so fidgety or restless that you have been moving around a lot more than usual: Not at all   Thoughts that you would be better off dead or hurting yourself in some way: Not at all   If you indicated you have experienced any of the aforementioned problems, how difficult have these problems made it for you to do your work, take care of things at home or get along with other people? Somewhat difficult   Total Score 5       Generalized Anxiety Disorder 7-Item Scale:  GAD7 6/7/2022   1. Feeling nervous, anxious, or on edge? 0   2. Not being able to stop or control worrying? 0   3. Worrying too much about different things? 0   4. Trouble relaxing? 0   5. Being so restless that it is hard to sit still? 0   6. Becoming easily annoyed or irritable? 0   7. Feeling afraid as if something awful might happen? 0   8. If you checked off any problems, how difficult have these problems made it for you to do your work, take care of things at home, or get along with  other people? -   ILENE-7 Score 0       Patients' Global Impression of Change (PGIC) Scale:  Since beginning treatment at this clinic, how would you describe the change (if any) in ACTIVITY LIMITATIONS, SYMPTOMS, EMOTIONS, and OVERALL QUALITY OF LIFE, related to your painful condition?  No Value exists for the : OHS#82972      In a similar way, please check the number below that matches your degree of change since beginning care at this clinic (Much better (0) - Much Worse (10)): No Value exists for the : OHS#88498        Much Better                                     No Change                                    Much Worse                        -----------------------------------------------------------------------------                        0       1       2       3       4       5       6       7      8       9      10                     Call Summary     Patient's monthly assessments were completed.  P = 5 and G = 0. Patient began [facility] Program on 3/10/22 referred by PCP, Dr. Yolande Case. Pt's office appointment is 06/07/22 with Emilia Botello LCSW. AMY 06/07/22

## 2022-06-07 NOTE — PROGRESS NOTES
CHW reached out to pt to remind her of office appointment with Emilia Botello LCSW, on tomorrow. No answer, left VM, Sent reminder and assessment to pt portal.

## 2022-06-08 ENCOUNTER — CLINICAL SUPPORT (OUTPATIENT)
Dept: BEHAVIORAL HEALTH | Facility: CLINIC | Age: 27
End: 2022-06-08
Payer: COMMERCIAL

## 2022-06-08 DIAGNOSIS — F43.21 ADJUSTMENT DISORDER WITH DEPRESSED MOOD: Primary | ICD-10-CM

## 2022-06-08 PROCEDURE — 90832 PSYTX W PT 30 MINUTES: CPT | Mod: S$GLB,,, | Performed by: SOCIAL WORKER

## 2022-06-08 PROCEDURE — 90832 PR PSYCHOTHERAPY W/PATIENT, 30 MIN: ICD-10-PCS | Mod: S$GLB,,, | Performed by: SOCIAL WORKER

## 2022-06-08 NOTE — PROGRESS NOTES
Individual Psychotherapy (LCSW/PhD)  Mandy Blancomaryamsourav,  6/8/2022    Site:  [place]         Therapeutic Intervention: Met with patient for individual psychotherapy.  Current Goals: work on resume, complete flower exercise  Chief complaint/reason for encounter: depression     Interval history and content of current session:   Pt state that some things are better and some have been worse.    Pt reports pt's co-worker put in 2 weeks notice. Pt is now concerned about expectation that company may have for pt to increase job responsibilities. Pt states is barely keeping up and does not feel appreciated by the company.  Pt has felt loyalty to company, now unsustainable. Pt understands that company is not in charge of pt's happiness.  Discussed and reviewed having conversation with boss regarding situation. Multiple roll plays with pt on conversation and how to respond.     Pt states needs to move on, pt will bring resume to next appt.    Sent referral list.     Goals: resume     Treatment plan:  · Target symptoms: depression  · Why chosen therapy is appropriate versus another modality: relevant to diagnosis, patient responds to this modality, evidence based practice  · Outcome monitoring methods: self-report, checklist/rating scale  · Therapeutic intervention type: insight oriented psychotherapy, behavior modifying psychotherapy    Risk parameters:  Patient reports no suicidal ideation  Patient reports no homicidal ideation  Patient reports no self-injurious behavior  Patient reports no violent behavior    Verbal deficits: None    Patient's response to intervention:  The patient's response to intervention is accepting, guarded.    Progress toward goals and other mental status changes:  The patient's progress toward goals is fair .    Diagnosis:     ICD-10-CM ICD-9-CM   1. Adjustment disorder with depressed mood  F43.21 309.0       Plan: Pt plans to continue individual psychotherapy    Return to clinic: 2 weeks, 6/22  @ 4 pm    Length of Service (minutes): 30

## 2022-06-10 ENCOUNTER — OFFICE VISIT (OUTPATIENT)
Dept: PRIMARY CARE CLINIC | Facility: CLINIC | Age: 27
End: 2022-06-10
Payer: COMMERCIAL

## 2022-06-10 ENCOUNTER — TELEPHONE (OUTPATIENT)
Dept: ENDOCRINOLOGY | Facility: CLINIC | Age: 27
End: 2022-06-10
Payer: COMMERCIAL

## 2022-06-10 ENCOUNTER — TELEPHONE (OUTPATIENT)
Dept: PRIMARY CARE CLINIC | Facility: CLINIC | Age: 27
End: 2022-06-10
Payer: COMMERCIAL

## 2022-06-10 VITALS
DIASTOLIC BLOOD PRESSURE: 64 MMHG | TEMPERATURE: 98 F | HEART RATE: 51 BPM | RESPIRATION RATE: 16 BRPM | BODY MASS INDEX: 25.7 KG/M2 | SYSTOLIC BLOOD PRESSURE: 90 MMHG | WEIGHT: 173.5 LBS | OXYGEN SATURATION: 98 % | HEIGHT: 69 IN

## 2022-06-10 DIAGNOSIS — E05.00 GRAVES' EYE DISEASE: ICD-10-CM

## 2022-06-10 DIAGNOSIS — R79.89 ELEVATED LIVER FUNCTION TESTS: ICD-10-CM

## 2022-06-10 DIAGNOSIS — E55.9 VITAMIN D DEFICIENCY: ICD-10-CM

## 2022-06-10 DIAGNOSIS — Z00.00 ANNUAL PHYSICAL EXAM: Primary | ICD-10-CM

## 2022-06-10 DIAGNOSIS — E05.00 GRAVES' DISEASE: ICD-10-CM

## 2022-06-10 PROCEDURE — 3074F SYST BP LT 130 MM HG: CPT | Mod: CPTII,S$GLB,, | Performed by: FAMILY MEDICINE

## 2022-06-10 PROCEDURE — 1159F MED LIST DOCD IN RCRD: CPT | Mod: CPTII,S$GLB,, | Performed by: FAMILY MEDICINE

## 2022-06-10 PROCEDURE — 99395 PREV VISIT EST AGE 18-39: CPT | Mod: S$GLB,,, | Performed by: FAMILY MEDICINE

## 2022-06-10 PROCEDURE — 3008F PR BODY MASS INDEX (BMI) DOCUMENTED: ICD-10-PCS | Mod: CPTII,S$GLB,, | Performed by: FAMILY MEDICINE

## 2022-06-10 PROCEDURE — 3074F PR MOST RECENT SYSTOLIC BLOOD PRESSURE < 130 MM HG: ICD-10-PCS | Mod: CPTII,S$GLB,, | Performed by: FAMILY MEDICINE

## 2022-06-10 PROCEDURE — 3078F PR MOST RECENT DIASTOLIC BLOOD PRESSURE < 80 MM HG: ICD-10-PCS | Mod: CPTII,S$GLB,, | Performed by: FAMILY MEDICINE

## 2022-06-10 PROCEDURE — 1159F PR MEDICATION LIST DOCUMENTED IN MEDICAL RECORD: ICD-10-PCS | Mod: CPTII,S$GLB,, | Performed by: FAMILY MEDICINE

## 2022-06-10 PROCEDURE — 99999 PR PBB SHADOW E&M-EST. PATIENT-LVL IV: ICD-10-PCS | Mod: PBBFAC,,, | Performed by: FAMILY MEDICINE

## 2022-06-10 PROCEDURE — 1160F PR REVIEW ALL MEDS BY PRESCRIBER/CLIN PHARMACIST DOCUMENTED: ICD-10-PCS | Mod: CPTII,S$GLB,, | Performed by: FAMILY MEDICINE

## 2022-06-10 PROCEDURE — 99999 PR PBB SHADOW E&M-EST. PATIENT-LVL IV: CPT | Mod: PBBFAC,,, | Performed by: FAMILY MEDICINE

## 2022-06-10 PROCEDURE — 3078F DIAST BP <80 MM HG: CPT | Mod: CPTII,S$GLB,, | Performed by: FAMILY MEDICINE

## 2022-06-10 PROCEDURE — 3008F BODY MASS INDEX DOCD: CPT | Mod: CPTII,S$GLB,, | Performed by: FAMILY MEDICINE

## 2022-06-10 PROCEDURE — 99395 PR PREVENTIVE VISIT,EST,18-39: ICD-10-PCS | Mod: S$GLB,,, | Performed by: FAMILY MEDICINE

## 2022-06-10 PROCEDURE — 1160F RVW MEDS BY RX/DR IN RCRD: CPT | Mod: CPTII,S$GLB,, | Performed by: FAMILY MEDICINE

## 2022-06-10 RX ORDER — METHIMAZOLE 10 MG/1
20 TABLET ORAL DAILY
Qty: 180 TABLET | Refills: 0 | Status: SHIPPED | OUTPATIENT
Start: 2022-06-10 | End: 2022-07-22 | Stop reason: SDUPTHER

## 2022-06-10 RX ORDER — DAPSONE 75 MG/G
GEL TOPICAL
COMMUNITY
Start: 2022-05-26

## 2022-06-10 NOTE — TELEPHONE ENCOUNTER
Patient due for follow up appt. I do not see one scheduled. I am ordering labs today and will include thyroid and PTH studies

## 2022-06-10 NOTE — PROGRESS NOTES
[facility] Note    Chief Complaint      Chief Complaint   Patient presents with    Follow-up       History of Present Illness      Mandy Ayon is a 26 y.o. female with  who presents today for:    Screening:    Colon- at 45   MMG- at 40   Pap- cyto neg and hpv neg 1/22   BD- at 65    Immunizations:   COVID- due for boost   Flu-  Due sept   Shingles- at 50   Pneumonia- at 65  Tetanus- 2018    Activity- works at [employer], seated, gym 5/week  Diet- pescatarian, potato, vegetable  Water- >64oz  Tob- no  EtOH- occ    Doing well on tapazole, no palpitation, temp instability and skin issues have resolved. She is due for lab and followup with endocrine    Feels like therapy is helping with some of her anxiety - family and work.            Patient Care Team:  Yolande Case MD as PCP - General (Internal Medicine)     Health Maintenance:  Immunization History   Administered Date(s) Administered    COVID-19, MRNA, LN-S, PF (Pfizer) (Purple Cap) 03/16/2021, 04/06/2021    DTaP 02/28/1996, 04/24/1996, 06/17/1996, 02/27/1998, 08/21/2000    HIB 02/28/1996, 04/24/1996, 06/17/1996, 08/14/1997    HPV Quadrivalent 05/16/2009, 09/21/2009, 12/21/2009    Hepatitis B, Pediatric/Adolescent 1995, 01/17/1996, 09/27/1996    IPV 08/21/2000    Influenza - Quadrivalent 01/29/2020    Influenza A (H1N1) 2009 Monovalent - IM 12/21/2009    MMR 03/14/1997, 08/21/2000    Meningococcal Conjugate (MCV4P) 09/21/2009, 08/09/2014    OPV 02/28/1996, 04/28/1996, 02/27/1998    Tdap 05/30/2007, 08/29/2018      Health Maintenance   Topic Date Due    Lipid Panel  Never done    Pap Smear  01/12/2025    TETANUS VACCINE  08/29/2028    Hepatitis C Screening  Completed    HPV Vaccines  Completed        Past Medical History:  Past Medical History:   Diagnosis Date    Hyperthyroidism 02/06/2022       Past Surgical History:   has a past surgical history that includes none.    Family History:  family history includes  "Diabetes in her maternal grandmother.     Social History:  Social History     Tobacco Use    Smoking status: Never Smoker    Smokeless tobacco: Never Used   Substance Use Topics    Alcohol use: Yes     Comment: weekly    Drug use: Never       Review of Systems   Constitutional: Negative for fever.   Respiratory: Negative for shortness of breath.    Cardiovascular: Negative for chest pain.   Gastrointestinal: Negative for change in bowel habit and change in bowel habit.   Genitourinary: Negative for difficulty urinating.        Medications:    Current Outpatient Medications:     dapsone (ACZONE) 7.5 % GlwP, Apply topically., Disp: , Rfl:     ibuprofen (ADVIL,MOTRIN) 200 MG tablet, Take 200 mg by mouth every 6 (six) hours as needed for Pain., Disp: , Rfl:     selenium 100 mcg Tab, Take 1 tablet by mouth 2 (two) times a day., Disp: 180 each, Rfl: 0    spironolactone (ALDACTONE) 100 MG tablet, Take 100 mg by mouth once daily., Disp: , Rfl:     methIMAzole (TAPAZOLE) 10 MG Tab, Take 2 tablets (20 mg total) by mouth once daily., Disp: 180 tablet, Rfl: 0    Current Facility-Administered Medications:     etonogestreL subdermal device 68 mg, 68 mg, Implant, , Chacha Hagan PA-C, 68 mg at 02/08/22 0920     Allergies:  Review of patient's allergies indicates:  No Known Allergies    Physical Exam      Vital Signs  Temp: 98.2 °F (36.8 °C)  Pulse: (!) 51  Resp: 16  SpO2: 98 %  BP: 90/64  BP Location: Right arm  Pain Score: 0-No pain  Height and Weight  Height: 5' 9" (175.3 cm)  Weight: 78.7 kg (173 lb 8 oz)  BSA (Calculated - sq m): 1.96 sq meters  BMI (Calculated): 25.6  Weight in (lb) to have BMI = 25: 168.9             Physical Exam  Vitals reviewed.   Constitutional:       General: She is not in acute distress.     Appearance: Normal appearance.   HENT:      Right Ear: External ear normal.      Left Ear: External ear normal.   Eyes:      Conjunctiva/sclera: Conjunctivae normal.      Comments: No exophth     Neck: "      Comments: Thyromegaly improved  Cardiovascular:      Rate and Rhythm: Normal rate and regular rhythm.      Heart sounds: No murmur heard.    No friction rub. No gallop.   Pulmonary:      Effort: Pulmonary effort is normal.      Breath sounds: No wheezing, rhonchi or rales.   Musculoskeletal:      Cervical back: Neck supple.      Right lower leg: No edema.      Left lower leg: No edema.   Skin:     General: Skin is warm and dry.   Neurological:      General: No focal deficit present.      Mental Status: She is alert.   Psychiatric:         Mood and Affect: Mood normal.         Behavior: Behavior normal.          Laboratory:  CBC:  Recent Labs   Lab 02/03/22  1218   WBC 7.09   RBC 4.70   Hemoglobin 12.4   Hematocrit 40.1   Platelets 313   MCV 85   MCH 26.4 L   MCHC 30.9 L       CMP:  Recent Labs   Lab 02/03/22  1218 02/10/22  0851 03/02/22  1010   Glucose 75   < > 109   Calcium 12.0 H   < > 10.3   Albumin 4.1   < > 3.9   Total Protein 7.6  --  7.1   Sodium 138   < > 137   Potassium 4.2   < > 3.8   CO2 24   < > 23   Chloride 103   < > 103   BUN 10   < > 9   Creatinine 0.6   < > 0.6   Alkaline Phosphatase 99  --  99   ALT 52 H  --  46 H   AST 71 H  --  70 H   Total Bilirubin 0.7  --  0.5    < > = values in this interval not displayed.           URINALYSIS:         LIPIDS:  Recent Labs   Lab 02/03/22  1218 02/10/22  0851 03/02/22  1010   TSH <0.010 L <0.010 L <0.010 L       TSH:  Recent Labs   Lab 02/03/22  1218 02/10/22  0851 03/02/22  1010   TSH <0.010 L <0.010 L <0.010 L       A1C:        Urine Microalbumin/Cr:          Other:       Recent Labs   Lab 04/13/22  0907   Hepatitis C Ab Negative       Assessment/Plan     Mandy Ayon is a 26 y.o.female with:    Annual physical exam  -     Lipid Panel; Future; Expected date: 06/10/2022  Physical activity, diet, healthy lifestyle, alcohol, tobacco, screenings and immunizations discussed.    Graves' eye disease  -     T4, Free; Future; Expected date: 06/10/2022  -      TSH; Future; Expected date: 06/10/2022  -     PTH, Intact; Future; Expected date: 06/10/2022  -     THYROTROPIN RECEPTOR ANTIBODY; Future; Expected date: 06/10/2022  -     T3; Future; Expected date: 06/10/2022    Vitamin D deficiency  -     PTH, Intact; Future; Expected date: 06/10/2022  -     Cancel: Calcitriol (1,25 di-OH Vitamin D); Future; Expected date: 06/10/2022  -     Vitamin D; Future; Expected date: 06/10/2022    Elevated liver function tests  -     Comprehensive Metabolic Panel; Future; Expected date: 06/10/2022    Graves' disease  -     methIMAzole (TAPAZOLE) 10 MG Tab; Take 2 tablets (20 mg total) by mouth once daily.  Dispense: 180 tablet; Refill: 0         Chronic conditions status updated as per HPI.  Other than changes above, cont current medications and maintain follow up with specialists.  Return to clinic in Follow up in about 1 year (around 6/10/2023).      Yolande Case MD  [facility]

## 2022-06-17 ENCOUNTER — PATIENT MESSAGE (OUTPATIENT)
Dept: BEHAVIORAL HEALTH | Facility: CLINIC | Age: 27
End: 2022-06-17
Payer: COMMERCIAL

## 2022-06-21 ENCOUNTER — TELEPHONE (OUTPATIENT)
Dept: BEHAVIORAL HEALTH | Facility: CLINIC | Age: 27
End: 2022-06-21
Payer: COMMERCIAL

## 2022-06-21 NOTE — PROGRESS NOTES
CHW reached out to pt to remind her of office appointment with Emilia Botello LCSW, on tomorrow. No answer, left VM.

## 2022-06-22 ENCOUNTER — CLINICAL SUPPORT (OUTPATIENT)
Dept: BEHAVIORAL HEALTH | Facility: CLINIC | Age: 27
End: 2022-06-22
Payer: COMMERCIAL

## 2022-06-22 DIAGNOSIS — F43.21 ADJUSTMENT DISORDER WITH DEPRESSED MOOD: Primary | ICD-10-CM

## 2022-06-22 PROCEDURE — 90832 PR PSYCHOTHERAPY W/PATIENT, 30 MIN: ICD-10-PCS | Mod: S$GLB,,, | Performed by: SOCIAL WORKER

## 2022-06-22 PROCEDURE — 90832 PSYTX W PT 30 MINUTES: CPT | Mod: S$GLB,,, | Performed by: SOCIAL WORKER

## 2022-06-22 NOTE — PROGRESS NOTES
"Individual Psychotherapy (LCSW/PhD)  Mandy Blancomaryamsourav,  6/22/2022    Site:  [place]         Therapeutic Intervention: Met with patient for individual psychotherapy.    Chief complaint/reason for encounter: depression     Interval history and content of current session:   Father ex GF was on FB and in person contact with family members, including father's boss, and sent a care package to ex wife (pt's mom). Pt states that stress related to the trauma associated with private family issues being posted on social media.  Discussed having to start setting boundaries when being uncomfortable situations with family. Pt states a mix of "weird, sad feelings with family.      Pt reports having had 2 panic attacks while at the airport traveling to [facility] by way of [place]. Pt states that was assisted by 2 ladies who helped pt to get on the plan. Pt explored reasoning behind the feelings anxiety attacks. Pt states after reflection feels it was the lack of control of getting what pt felt pt needed.     Pt states that resume is updated and printed and is moving forward with looking for a new job.     Discussed termination, referrals given to pt.       Treatment plan:  · Target symptoms: depression  · Why chosen therapy is appropriate versus another modality: relevant to diagnosis, patient responds to this modality, evidence based practice  · Outcome monitoring methods: self-report, checklist/rating scale  · Therapeutic intervention type: insight oriented psychotherapy, behavior modifying psychotherapy, supportive psychotherapy    Risk parameters:  Patient reports no suicidal ideation  Patient reports no homicidal ideation  Patient reports no self-injurious behavior  Patient reports no violent behavior    Verbal deficits: None    Patient's response to intervention:  The patient's response to intervention is guarded.    Progress toward goals and other mental status changes:  The patient's progress toward goals is " fair .    Diagnosis:     ICD-10-CM ICD-9-CM   1. Adjustment disorder with depressed mood  F43.21 309.0       Plan: Pt plans to continue individual psychotherapy    Return to clinic: 1 week    Length of Service (minutes): 30

## 2022-06-30 ENCOUNTER — TELEPHONE (OUTPATIENT)
Dept: BEHAVIORAL HEALTH | Facility: CLINIC | Age: 27
End: 2022-06-30
Payer: COMMERCIAL

## 2022-06-30 NOTE — PROGRESS NOTES
Individual Psychotherapy (LCSW/PhD)  Mandy Ayon,  7/1/2022    Site:  [place]         Therapeutic Intervention: Met with patient for individual psychotherapy.    Chief complaint/reason for encounter: depression     Interval history and content of current session:   Pt states that things have been very busy. Pt finds self annoyed, customers are not accommodating, went into supervisors office annoyed and did not tell supervisor what is wrong.  Discussed allowing self to put the same effort into self as pt would and does for others around self. Pt notes that has seen this in self recently. Pt plans to write a letter to boss and then present issues to boss after the holiday.     Has not moved forward with resume, pt will give resume to friends and acquaintances requesting to review,  help with open positions, and provide exposure and feed back. Discussed possibly applying to the [employer].       Pt states has had limited contact with family and feels has an over all better mood.    Pt has been working out and cooking for self  (healthy) and has felt better.     Pt is going to take it easy this weekend and start sending out resume, apply to [employer], write a letter to boss.   Discussed termination, pt referred to [facility] and given a list of resources.         Treatment plan:  · Target symptoms: depression  · Why chosen therapy is appropriate versus another modality: patient responds to this modality  · Outcome monitoring methods: self-report, checklist/rating scale  · Therapeutic intervention type: behavior modifying psychotherapy    Risk parameters:  Patient reports no suicidal ideation  Patient reports no homicidal ideation  Patient reports no self-injurious behavior  Patient reports no violent behavior    Verbal deficits: None    Patient's response to intervention:  The patient's response to intervention is guarded.    Progress toward goals and other mental status changes:  The patient's progress  toward goals is fair .    Diagnosis:     ICD-10-CM ICD-9-CM   1. Adjustment disorder with depressed mood  F43.21 309.0       Plan: Pt plans to continue terminate individual psychotherapy with LCSW and follow up with psychiatry.     Return to clinic: terminate with therapist    Length of Service (minutes): 30

## 2022-06-30 NOTE — PROGRESS NOTES
CHW reached out to pt to remind her of virtual appointment with Emilia Botello LCSW, on tomorrow. No answer, left VM of the appointment.

## 2022-07-01 ENCOUNTER — PATIENT MESSAGE (OUTPATIENT)
Dept: BEHAVIORAL HEALTH | Facility: CLINIC | Age: 27
End: 2022-07-01
Payer: COMMERCIAL

## 2022-07-01 ENCOUNTER — CLINICAL SUPPORT (OUTPATIENT)
Dept: BEHAVIORAL HEALTH | Facility: CLINIC | Age: 27
End: 2022-07-01
Payer: COMMERCIAL

## 2022-07-01 DIAGNOSIS — F43.21 ADJUSTMENT DISORDER WITH DEPRESSED MOOD: Primary | ICD-10-CM

## 2022-07-01 PROCEDURE — 90832 PSYTX W PT 30 MINUTES: CPT | Mod: S$GLB,,, | Performed by: SOCIAL WORKER

## 2022-07-01 PROCEDURE — 90832 PR PSYCHOTHERAPY W/PATIENT, 30 MIN: ICD-10-PCS | Mod: S$GLB,,, | Performed by: SOCIAL WORKER

## 2022-07-22 DIAGNOSIS — E05.00 GRAVES' DISEASE: ICD-10-CM

## 2022-07-22 RX ORDER — METHIMAZOLE 10 MG/1
20 TABLET ORAL DAILY
Qty: 60 TABLET | Refills: 11 | Status: SHIPPED | OUTPATIENT
Start: 2022-07-22 | End: 2023-03-13 | Stop reason: SDUPTHER

## 2023-01-25 ENCOUNTER — PATIENT MESSAGE (OUTPATIENT)
Dept: ADMINISTRATIVE | Facility: HOSPITAL | Age: 28
End: 2023-01-25
Payer: COMMERCIAL

## 2023-03-10 NOTE — PROGRESS NOTES
Subjective:      Patient ID: Mandy Ayon is a 27 y.o. female.    Chief Complaint:  hyperthyroidism    History of Present Illness  27 y.o. woman with hyperthyroidism who presents for follow up. Previous patient of Dr. Ro. Last visit in March 2022. This is her first visit with me.    With regards to her hyperthyroidism:    No FH of thyroid disease    Was found to have hyperthyroidism on labs but was having symptoms of weight loss, heat intolerance, fatigue, palpitations, bulging eyes and tremors. She was on atenolol which was stopped when she was asymptomatic.     Lab Results   Component Value Date    TSH <0.010 (L) 03/02/2022     Etiology likely  Graves     Thyroid scan: none     Thyroid ultrasound: none     Treatment:none     I131 therapy  none      Current hyperthyroid medication:   methimazole 20 mg qd - started Feb 2022        current symptoms:   No palpations  Denies weight loss    Denies excessive hungry    Nl bm  No Hair loss  No Brittle nails  No skin changes    No tremor   No anxiety  No insomnia   Denies heat intolerance     Insertion of Nexplanon  2/8/22  Denies pregnancy at this time    Denies new eye symptoms: denies     No longer on selenium  Works for a [employer]   On Aldactone for acne    Denies dysphagia or voice changes  She has not seen eye specialist - Dr Johns - but notes her eyes have improved over the last year on methimazole    Lab Results   Component Value Date    TSH <0.010 (L) 03/02/2022    B6IGHJB 236 (H) 03/02/2022    FREET4 1.43 03/02/2022      Latest Reference Range & Units 02/10/22 08:51   Thyrotropin Receptor Ab 0.00 - 1.75 IU/L 9.69 (H)   (H): Data is abnormally high    Did have hives end of feb  - saw derm     She remains on zyrtec daily but no longer on Benadryl as hives have resolved.     She was noted to have non PTH mediated hypercalcemia on labs which has now resolved.     Also she was noted to have abnormal liver enzymes which have been stable.  ROS:   As  "above    Objective:     /74   Pulse 63   Ht 5' 9" (1.753 m)   Wt 78.8 kg (173 lb 11.6 oz)   SpO2 99%   BMI 25.65 kg/m²   BP Readings from Last 3 Encounters:   03/13/23 110/74   06/10/22 90/64   03/10/22 118/72     Wt Readings from Last 1 Encounters:   03/13/23 0843 78.8 kg (173 lb 11.6 oz)     Body mass index is 25.65 kg/m².    Physical Exam  Psychiatric:         Mood and Affect: Mood normal.         Thought Content: Thought content normal.         Judgment: Judgment normal.     Lab Review:   No results found for: HGBA1C  No results found for: CHOL, HDL, LDLCALC, TRIG, CHOLHDL  Lab Results   Component Value Date     03/02/2022    K 3.8 03/02/2022     03/02/2022    CO2 23 03/02/2022     03/02/2022    BUN 9 03/02/2022    CREATININE 0.6 03/02/2022    CALCIUM 10.3 03/02/2022    PROT 7.1 03/02/2022    ALBUMIN 3.9 03/02/2022    BILITOT 0.5 03/02/2022    ALKPHOS 99 03/02/2022    AST 70 (H) 03/02/2022    ALT 46 (H) 03/02/2022    ANIONGAP 11 03/02/2022    ESTGFRAFRICA >60.0 03/02/2022    EGFRNONAA >60.0 03/02/2022    TSH <0.010 (L) 03/02/2022     No results found for: [id number]    Assessment and Plan     1. Graves' disease  Comprehensive Metabolic Panel    CBC Auto Differential    TSH    T4, Free    methIMAzole (TAPAZOLE) 10 MG Tab    Thyroid Stimulating Immunoglobulin    Thyrotropin Receptor Antibody      2. Graves' eye disease        3. Abnormal liver enzymes        4. Hives          Graves' disease  Reviewed treatment options of I131, ATD or surgery, surgery being least desirable.    Discussed risks of worsening GO with I131    Discussed the chance of Graves remission (30%) after 2 years of ATD therapy - with a 50% chance of recurrence    Continue Methimazole 20 mg qd   Call if fever, sore throat, rash, anorexia, nausea or vomiitng.  Reviewed side effects of ATDs are rare (agranulocytosis and liver failure) but can be very serious.      Check labs today including antibodies to assess if " she is ready to wean    Graves' eye disease  Encouraged to make appt with Dr Johns     Abnormal liver enzymes  Likely due Graves Disease  Check today    Hives  Resolved    Follow up in about 6 months (around 9/13/2023).

## 2023-03-13 ENCOUNTER — OFFICE VISIT (OUTPATIENT)
Dept: ENDOCRINOLOGY | Facility: CLINIC | Age: 28
End: 2023-03-13
Payer: COMMERCIAL

## 2023-03-13 ENCOUNTER — LAB VISIT (OUTPATIENT)
Dept: LAB | Facility: HOSPITAL | Age: 28
End: 2023-03-13
Payer: COMMERCIAL

## 2023-03-13 ENCOUNTER — PATIENT MESSAGE (OUTPATIENT)
Dept: ENDOCRINOLOGY | Facility: CLINIC | Age: 28
End: 2023-03-13

## 2023-03-13 VITALS
HEART RATE: 63 BPM | BODY MASS INDEX: 25.73 KG/M2 | SYSTOLIC BLOOD PRESSURE: 110 MMHG | HEIGHT: 69 IN | OXYGEN SATURATION: 99 % | WEIGHT: 173.75 LBS | DIASTOLIC BLOOD PRESSURE: 74 MMHG

## 2023-03-13 DIAGNOSIS — R74.8 ABNORMAL LIVER ENZYMES: ICD-10-CM

## 2023-03-13 DIAGNOSIS — E05.00 GRAVES' DISEASE: ICD-10-CM

## 2023-03-13 DIAGNOSIS — E05.00 GRAVES' EYE DISEASE: ICD-10-CM

## 2023-03-13 DIAGNOSIS — L50.9 HIVES: ICD-10-CM

## 2023-03-13 LAB
ALBUMIN SERPL BCP-MCNC: 4.4 G/DL (ref 3.5–5.2)
ALP SERPL-CCNC: 56 U/L (ref 55–135)
ALT SERPL W/O P-5'-P-CCNC: 20 U/L (ref 10–44)
ANION GAP SERPL CALC-SCNC: 7 MMOL/L (ref 8–16)
AST SERPL-CCNC: 49 U/L (ref 10–40)
BASOPHILS # BLD AUTO: 0.05 K/UL (ref 0–0.2)
BASOPHILS NFR BLD: 1.1 % (ref 0–1.9)
BILIRUB SERPL-MCNC: 0.8 MG/DL (ref 0.1–1)
BUN SERPL-MCNC: 8 MG/DL (ref 6–20)
CALCIUM SERPL-MCNC: 9.9 MG/DL (ref 8.7–10.5)
CHLORIDE SERPL-SCNC: 106 MMOL/L (ref 95–110)
CO2 SERPL-SCNC: 27 MMOL/L (ref 23–29)
CREAT SERPL-MCNC: 0.8 MG/DL (ref 0.5–1.4)
DIFFERENTIAL METHOD: ABNORMAL
EOSINOPHIL # BLD AUTO: 0.1 K/UL (ref 0–0.5)
EOSINOPHIL NFR BLD: 3.1 % (ref 0–8)
ERYTHROCYTE [DISTWIDTH] IN BLOOD BY AUTOMATED COUNT: 12.8 % (ref 11.5–14.5)
EST. GFR  (NO RACE VARIABLE): >60 ML/MIN/1.73 M^2
GLUCOSE SERPL-MCNC: 83 MG/DL (ref 70–110)
HCT VFR BLD AUTO: 37.2 % (ref 37–48.5)
HGB BLD-MCNC: 11.9 G/DL (ref 12–16)
IMM GRANULOCYTES # BLD AUTO: 0.01 K/UL (ref 0–0.04)
IMM GRANULOCYTES NFR BLD AUTO: 0.2 % (ref 0–0.5)
LYMPHOCYTES # BLD AUTO: 2 K/UL (ref 1–4.8)
LYMPHOCYTES NFR BLD: 42.5 % (ref 18–48)
MCH RBC QN AUTO: 28.4 PG (ref 27–31)
MCHC RBC AUTO-ENTMCNC: 32 G/DL (ref 32–36)
MCV RBC AUTO: 89 FL (ref 82–98)
MONOCYTES # BLD AUTO: 0.3 K/UL (ref 0.3–1)
MONOCYTES NFR BLD: 7 % (ref 4–15)
NEUTROPHILS # BLD AUTO: 2.1 K/UL (ref 1.8–7.7)
NEUTROPHILS NFR BLD: 46.1 % (ref 38–73)
NRBC BLD-RTO: 0 /100 WBC
PLATELET # BLD AUTO: 240 K/UL (ref 150–450)
PMV BLD AUTO: 10.8 FL (ref 9.2–12.9)
POTASSIUM SERPL-SCNC: 3.9 MMOL/L (ref 3.5–5.1)
PROT SERPL-MCNC: 7.3 G/DL (ref 6–8.4)
RBC # BLD AUTO: 4.19 M/UL (ref 4–5.4)
SODIUM SERPL-SCNC: 140 MMOL/L (ref 136–145)
T4 FREE SERPL-MCNC: 0.8 NG/DL (ref 0.71–1.51)
TSH SERPL DL<=0.005 MIU/L-ACNC: 14.33 UIU/ML (ref 0.4–4)
WBC # BLD AUTO: 4.59 K/UL (ref 3.9–12.7)

## 2023-03-13 PROCEDURE — 85025 COMPLETE CBC W/AUTO DIFF WBC: CPT | Performed by: NURSE PRACTITIONER

## 2023-03-13 PROCEDURE — 84439 ASSAY OF FREE THYROXINE: CPT | Performed by: NURSE PRACTITIONER

## 2023-03-13 PROCEDURE — 80053 COMPREHEN METABOLIC PANEL: CPT | Performed by: NURSE PRACTITIONER

## 2023-03-13 PROCEDURE — 1160F RVW MEDS BY RX/DR IN RCRD: CPT | Mod: CPTII,S$GLB,, | Performed by: NURSE PRACTITIONER

## 2023-03-13 PROCEDURE — 84443 ASSAY THYROID STIM HORMONE: CPT | Performed by: NURSE PRACTITIONER

## 2023-03-13 PROCEDURE — 36415 COLL VENOUS BLD VENIPUNCTURE: CPT | Performed by: NURSE PRACTITIONER

## 2023-03-13 PROCEDURE — 3078F DIAST BP <80 MM HG: CPT | Mod: CPTII,S$GLB,, | Performed by: NURSE PRACTITIONER

## 2023-03-13 PROCEDURE — 84445 ASSAY OF TSI GLOBULIN: CPT | Performed by: NURSE PRACTITIONER

## 2023-03-13 PROCEDURE — 3008F BODY MASS INDEX DOCD: CPT | Mod: CPTII,S$GLB,, | Performed by: NURSE PRACTITIONER

## 2023-03-13 PROCEDURE — 3078F PR MOST RECENT DIASTOLIC BLOOD PRESSURE < 80 MM HG: ICD-10-PCS | Mod: CPTII,S$GLB,, | Performed by: NURSE PRACTITIONER

## 2023-03-13 PROCEDURE — 3074F SYST BP LT 130 MM HG: CPT | Mod: CPTII,S$GLB,, | Performed by: NURSE PRACTITIONER

## 2023-03-13 PROCEDURE — 99214 PR OFFICE/OUTPT VISIT, EST, LEVL IV, 30-39 MIN: ICD-10-PCS | Mod: S$GLB,,, | Performed by: NURSE PRACTITIONER

## 2023-03-13 PROCEDURE — 99999 PR PBB SHADOW E&M-EST. PATIENT-LVL IV: CPT | Mod: PBBFAC,,, | Performed by: NURSE PRACTITIONER

## 2023-03-13 PROCEDURE — 99999 PR PBB SHADOW E&M-EST. PATIENT-LVL IV: ICD-10-PCS | Mod: PBBFAC,,, | Performed by: NURSE PRACTITIONER

## 2023-03-13 PROCEDURE — 3074F PR MOST RECENT SYSTOLIC BLOOD PRESSURE < 130 MM HG: ICD-10-PCS | Mod: CPTII,S$GLB,, | Performed by: NURSE PRACTITIONER

## 2023-03-13 PROCEDURE — 1159F MED LIST DOCD IN RCRD: CPT | Mod: CPTII,S$GLB,, | Performed by: NURSE PRACTITIONER

## 2023-03-13 PROCEDURE — 83520 IMMUNOASSAY QUANT NOS NONAB: CPT | Performed by: NURSE PRACTITIONER

## 2023-03-13 PROCEDURE — 3008F PR BODY MASS INDEX (BMI) DOCUMENTED: ICD-10-PCS | Mod: CPTII,S$GLB,, | Performed by: NURSE PRACTITIONER

## 2023-03-13 PROCEDURE — 1160F PR REVIEW ALL MEDS BY PRESCRIBER/CLIN PHARMACIST DOCUMENTED: ICD-10-PCS | Mod: CPTII,S$GLB,, | Performed by: NURSE PRACTITIONER

## 2023-03-13 PROCEDURE — 1159F PR MEDICATION LIST DOCUMENTED IN MEDICAL RECORD: ICD-10-PCS | Mod: CPTII,S$GLB,, | Performed by: NURSE PRACTITIONER

## 2023-03-13 PROCEDURE — 99214 OFFICE O/P EST MOD 30 MIN: CPT | Mod: S$GLB,,, | Performed by: NURSE PRACTITIONER

## 2023-03-13 RX ORDER — METHIMAZOLE 10 MG/1
20 TABLET ORAL DAILY
Qty: 180 TABLET | Refills: 3 | Status: SHIPPED | OUTPATIENT
Start: 2023-03-13 | End: 2023-03-13

## 2023-03-13 RX ORDER — METHIMAZOLE 5 MG/1
5 TABLET ORAL DAILY
Qty: 90 TABLET | Refills: 3 | Status: SHIPPED | OUTPATIENT
Start: 2023-03-13 | End: 2023-04-11

## 2023-03-13 NOTE — PATIENT INSTRUCTIONS
Thyroid   Continue methimazole 20 mg daily  Check labs today including antibodies to assess if she is ready to wean    Encouraged her to let me know if she develops a rash, yellowing of skin, fever, or sore throat while taking this medication.      Dr Johns is eye specialist for Graves

## 2023-03-13 NOTE — ASSESSMENT & PLAN NOTE
Reviewed treatment options of I131, ATD or surgery, surgery being least desirable.    Discussed risks of worsening GO with I131    Discussed the chance of Graves remission (30%) after 2 years of ATD therapy - with a 50% chance of recurrence    Continue Methimazole 20 mg qd   Call if fever, sore throat, rash, anorexia, nausea or vomiitng.  Reviewed side effects of ATDs are rare (agranulocytosis and liver failure) but can be very serious.      Check labs today including antibodies to assess if she is ready to wean   Doxycycline Counseling:  Patient counseled regarding possible photosensitivity and increased risk for sunburn.  Patient instructed to avoid sunlight, if possible.  When exposed to sunlight, patients should wear protective clothing, sunglasses, and sunscreen.  The patient was instructed to call the office immediately if the following severe adverse effects occur:  hearing changes, easy bruising/bleeding, severe headache, or vision changes.  The patient verbalized understanding of the proper use and possible adverse effects of doxycycline.  All of the patient's questions and concerns were addressed.

## 2023-03-14 LAB — TSH RECEP AB SER-ACNC: 2.4 IU/L (ref 0–1.75)

## 2023-03-16 LAB — TSI SER-ACNC: 4.12 IU/L

## 2023-03-22 NOTE — PROGRESS NOTES
"Mandy Ayon  1995        Subjective     Chief Complaint: Est Care    History of Present Illness:  Ms. Mandy Ayon is a 27 y.o. female who presents to clinic for est care.     Graves' Disease- On Methimazole 20 mg-->5 mg. Following with Willow Henning NP in [facility]. Was seeing Dr. Ro. Per notes,   "Was found to have hyperthyroidism on labs but was having symptoms of weight loss, heat intolerance, fatigue, palpitations, bulging eyes and tremors. She was on atenolol which was stopped when she was asymptomatic." +TPO Ab    Was feeling very tired and weight gain.     Dose decreased last week.     LFTs decreasing.     No blurry vision.     On spironolactone for back acne, working well. Dapsone cream PRN for abck as well. Out of system.     OBGYN- at [facility], [place], operated by [facility]. Pap in 2022.     Review of Systems   Constitutional:  Positive for malaise/fatigue. Negative for chills, fever and weight loss.   HENT:  Negative for congestion and sore throat.    Eyes:  Negative for blurred vision, double vision, pain and redness.   Respiratory:  Negative for cough.    Cardiovascular:  Negative for chest pain, palpitations and leg swelling.   Gastrointestinal:  Negative for abdominal pain and vomiting.   Musculoskeletal:  Negative for joint pain.   Skin:  Negative for rash.   Neurological:  Negative for sensory change, speech change and focal weakness.      PAST HISTORY:     Past Medical History:   Diagnosis Date    Hyperthyroidism 02/06/2022       Past Surgical History:   Procedure Laterality Date    none         Family History   Problem Relation Age of Onset    Diabetes Maternal Grandmother     Breast cancer Neg Hx     Colon cancer Neg Hx     Ovarian cancer Neg Hx     Heart attacks under age 50 Neg Hx        Social History     Socioeconomic History    Marital status: Single   Tobacco Use    Smoking status: Never    Smokeless tobacco: Never   Substance and Sexual Activity    Alcohol use: Yes     Comment: weekly    Drug use: " "Never    Sexual activity: Yes     Partners: Male     Birth control/protection: Condom       MEDICATIONS & ALLERGIES:     Current Outpatient Medications on File Prior to Visit   Medication Sig    cetirizine (ZYRTEC) 10 MG tablet Take 10 mg by mouth once daily.    dapsone (ACZONE) 7.5 % GlwP Apply topically.    ibuprofen (ADVIL,MOTRIN) 200 MG tablet Take 200 mg by mouth every 6 (six) hours as needed for Pain.    methIMAzole (TAPAZOLE) 5 MG Tab Take 1 tablet (5 mg total) by mouth once daily.    spironolactone (ALDACTONE) 100 MG tablet Take 100 mg by mouth once daily.    [DISCONTINUED] selenium 100 mcg Tab Take 1 tablet by mouth 2 (two) times a day. (Patient not taking: Reported on 3/23/2023)     Current Facility-Administered Medications on File Prior to Visit   Medication    etonogestreL subdermal device 68 mg       Review of patient's allergies indicates:  No Known Allergies    OBJECTIVE:     Vital Signs:  Vitals:    03/23/23 0840   BP: 100/64   BP Location: Right arm   Patient Position: Sitting   BP Method: Medium (Manual)   Pulse: 64   SpO2: 98%   Weight: 79 kg (174 lb 2.6 oz)   Height: 5' 9" (1.753 m)       Body mass index is 25.72 kg/m².     Physical Exam:  Physical Exam  Vitals and nursing note reviewed.   Constitutional:       General: She is not in acute distress.     Appearance: Normal appearance. She is not ill-appearing, toxic-appearing or diaphoretic.   HENT:      Head: Normocephalic and atraumatic.   Eyes:      General: No scleral icterus.        Right eye: No discharge.         Left eye: No discharge.      Conjunctiva/sclera: Conjunctivae normal.      Comments: No severe proptosis   Neck:      Comments: Goiter present  Cardiovascular:      Rate and Rhythm: Normal rate and regular rhythm.      Pulses: Normal pulses.      Heart sounds: No murmur heard.  Pulmonary:      Effort: Pulmonary effort is normal. No respiratory distress.      Breath sounds: Normal breath sounds. No wheezing.   Musculoskeletal:      " Cervical back: Normal range of motion and neck supple.      Right lower leg: No edema.      Left lower leg: No edema.   Skin:     General: Skin is warm and dry.   Neurological:      Mental Status: She is alert and oriented to person, place, and time.   Psychiatric:         Mood and Affect: Mood and affect normal.         Behavior: Behavior normal.          Laboratory  Lab Results   Component Value Date    WBC 4.59 03/13/2023    HGB 11.9 (L) 03/13/2023    HCT 37.2 03/13/2023    MCV 89 03/13/2023     03/13/2023     Lab Results   Component Value Date    GLU 83 03/13/2023     03/13/2023    K 3.9 03/13/2023     03/13/2023    CO2 27 03/13/2023    BUN 8 03/13/2023    CREATININE 0.8 03/13/2023    CALCIUM 9.9 03/13/2023    MG 1.3 (L) 02/03/2022     No results found for: INR, PROTIME  No results found for: HGBA1C        Health Maintenance         Date Due Completion Date    Lipid Panel Never done ---    COVID-19 Vaccine (3 - Booster for Pfizer series) 06/01/2021 4/6/2021    Influenza Vaccine (1) 09/01/2022 1/29/2020    Pap Smear 01/12/2025 1/12/2022    TETANUS VACCINE 08/29/2028 8/29/2018                ASSESSMENT & PLAN:   Ms. Mandy Ayon is a 27 y.o. female who was seen today in clinic for est care. Doing well. Following with Endo for her Graves'. Dose recently decreased to 5 mg. Reports was no thyroid US needed, can order if needed per Endo.      1. Graves' disease  -     Comprehensive Metabolic Panel; Future; Expected date: 04/11/2023  -     Lipid Panel; Future; Expected date: 04/11/2023  -     Vitamin D; Future; Expected date: 04/11/2023    2. Abnormal liver enzymes  -     Comprehensive Metabolic Panel; Future; Expected date: 04/11/2023  -     Lipid Panel; Future; Expected date: 04/11/2023  -     Vitamin D; Future; Expected date: 04/11/2023    3. Graves' eye disease  -     Comprehensive Metabolic Panel; Future; Expected date: 04/11/2023  -     Lipid Panel; Future; Expected date: 04/11/2023  -      Vitamin D; Future; Expected date: 04/11/2023    4. Depression, recurrent  -Doing well, not on meds           Tammy Hazel MD  Internal Medicine

## 2023-03-23 ENCOUNTER — OFFICE VISIT (OUTPATIENT)
Dept: PRIMARY CARE CLINIC | Facility: CLINIC | Age: 28
End: 2023-03-23
Payer: COMMERCIAL

## 2023-03-23 VITALS
HEART RATE: 64 BPM | DIASTOLIC BLOOD PRESSURE: 64 MMHG | SYSTOLIC BLOOD PRESSURE: 100 MMHG | BODY MASS INDEX: 25.8 KG/M2 | OXYGEN SATURATION: 98 % | WEIGHT: 174.19 LBS | HEIGHT: 69 IN

## 2023-03-23 DIAGNOSIS — E05.00 GRAVES' DISEASE: Primary | ICD-10-CM

## 2023-03-23 DIAGNOSIS — E05.00 GRAVES' EYE DISEASE: ICD-10-CM

## 2023-03-23 DIAGNOSIS — F33.9 DEPRESSION, RECURRENT: ICD-10-CM

## 2023-03-23 DIAGNOSIS — R74.8 ABNORMAL LIVER ENZYMES: ICD-10-CM

## 2023-03-23 PROCEDURE — 3078F PR MOST RECENT DIASTOLIC BLOOD PRESSURE < 80 MM HG: ICD-10-PCS | Mod: CPTII,S$GLB,, | Performed by: STUDENT IN AN ORGANIZED HEALTH CARE EDUCATION/TRAINING PROGRAM

## 2023-03-23 PROCEDURE — 99999 PR PBB SHADOW E&M-EST. PATIENT-LVL III: ICD-10-PCS | Mod: PBBFAC,,, | Performed by: STUDENT IN AN ORGANIZED HEALTH CARE EDUCATION/TRAINING PROGRAM

## 2023-03-23 PROCEDURE — 3008F BODY MASS INDEX DOCD: CPT | Mod: CPTII,S$GLB,, | Performed by: STUDENT IN AN ORGANIZED HEALTH CARE EDUCATION/TRAINING PROGRAM

## 2023-03-23 PROCEDURE — 99999 PR PBB SHADOW E&M-EST. PATIENT-LVL III: CPT | Mod: PBBFAC,,, | Performed by: STUDENT IN AN ORGANIZED HEALTH CARE EDUCATION/TRAINING PROGRAM

## 2023-03-23 PROCEDURE — 3074F PR MOST RECENT SYSTOLIC BLOOD PRESSURE < 130 MM HG: ICD-10-PCS | Mod: CPTII,S$GLB,, | Performed by: STUDENT IN AN ORGANIZED HEALTH CARE EDUCATION/TRAINING PROGRAM

## 2023-03-23 PROCEDURE — 3008F PR BODY MASS INDEX (BMI) DOCUMENTED: ICD-10-PCS | Mod: CPTII,S$GLB,, | Performed by: STUDENT IN AN ORGANIZED HEALTH CARE EDUCATION/TRAINING PROGRAM

## 2023-03-23 PROCEDURE — 99395 PR PREVENTIVE VISIT,EST,18-39: ICD-10-PCS | Mod: S$GLB,,, | Performed by: STUDENT IN AN ORGANIZED HEALTH CARE EDUCATION/TRAINING PROGRAM

## 2023-03-23 PROCEDURE — 3074F SYST BP LT 130 MM HG: CPT | Mod: CPTII,S$GLB,, | Performed by: STUDENT IN AN ORGANIZED HEALTH CARE EDUCATION/TRAINING PROGRAM

## 2023-03-23 PROCEDURE — 99395 PREV VISIT EST AGE 18-39: CPT | Mod: S$GLB,,, | Performed by: STUDENT IN AN ORGANIZED HEALTH CARE EDUCATION/TRAINING PROGRAM

## 2023-03-23 PROCEDURE — 3078F DIAST BP <80 MM HG: CPT | Mod: CPTII,S$GLB,, | Performed by: STUDENT IN AN ORGANIZED HEALTH CARE EDUCATION/TRAINING PROGRAM

## 2023-03-23 RX ORDER — CETIRIZINE HYDROCHLORIDE 10 MG/1
10 TABLET ORAL DAILY
COMMUNITY

## 2023-04-11 ENCOUNTER — LAB VISIT (OUTPATIENT)
Dept: LAB | Facility: HOSPITAL | Age: 28
End: 2023-04-11
Attending: NURSE PRACTITIONER
Payer: COMMERCIAL

## 2023-04-11 DIAGNOSIS — R74.8 ABNORMAL LIVER ENZYMES: ICD-10-CM

## 2023-04-11 DIAGNOSIS — E05.00 GRAVES' EYE DISEASE: ICD-10-CM

## 2023-04-11 DIAGNOSIS — E05.00 GRAVES' DISEASE: ICD-10-CM

## 2023-04-11 LAB
25(OH)D3+25(OH)D2 SERPL-MCNC: 30 NG/ML (ref 30–96)
ALBUMIN SERPL BCP-MCNC: 4.4 G/DL (ref 3.5–5.2)
ALP SERPL-CCNC: 53 U/L (ref 55–135)
ALT SERPL W/O P-5'-P-CCNC: 15 U/L (ref 10–44)
ANION GAP SERPL CALC-SCNC: 10 MMOL/L (ref 8–16)
AST SERPL-CCNC: 50 U/L (ref 10–40)
BILIRUB SERPL-MCNC: 1 MG/DL (ref 0.1–1)
BUN SERPL-MCNC: 10 MG/DL (ref 6–20)
CALCIUM SERPL-MCNC: 9.8 MG/DL (ref 8.7–10.5)
CHLORIDE SERPL-SCNC: 106 MMOL/L (ref 95–110)
CHOLEST SERPL-MCNC: 171 MG/DL (ref 120–199)
CHOLEST/HDLC SERPL: 2.6 {RATIO} (ref 2–5)
CO2 SERPL-SCNC: 22 MMOL/L (ref 23–29)
CREAT SERPL-MCNC: 0.9 MG/DL (ref 0.5–1.4)
EST. GFR  (NO RACE VARIABLE): >60 ML/MIN/1.73 M^2
GLUCOSE SERPL-MCNC: 100 MG/DL (ref 70–110)
HDLC SERPL-MCNC: 65 MG/DL (ref 40–75)
HDLC SERPL: 38 % (ref 20–50)
LDLC SERPL CALC-MCNC: 94.4 MG/DL (ref 63–159)
NONHDLC SERPL-MCNC: 106 MG/DL
POTASSIUM SERPL-SCNC: 3.6 MMOL/L (ref 3.5–5.1)
PROT SERPL-MCNC: 7.5 G/DL (ref 6–8.4)
SODIUM SERPL-SCNC: 138 MMOL/L (ref 136–145)
T4 FREE SERPL-MCNC: 1.07 NG/DL (ref 0.71–1.51)
TRIGL SERPL-MCNC: 58 MG/DL (ref 30–150)
TSH SERPL DL<=0.005 MIU/L-ACNC: 3.76 UIU/ML (ref 0.4–4)

## 2023-04-11 PROCEDURE — 84443 ASSAY THYROID STIM HORMONE: CPT | Performed by: NURSE PRACTITIONER

## 2023-04-11 PROCEDURE — 80061 LIPID PANEL: CPT | Performed by: STUDENT IN AN ORGANIZED HEALTH CARE EDUCATION/TRAINING PROGRAM

## 2023-04-11 PROCEDURE — 36415 COLL VENOUS BLD VENIPUNCTURE: CPT | Mod: PN | Performed by: STUDENT IN AN ORGANIZED HEALTH CARE EDUCATION/TRAINING PROGRAM

## 2023-04-11 PROCEDURE — 80053 COMPREHEN METABOLIC PANEL: CPT | Performed by: STUDENT IN AN ORGANIZED HEALTH CARE EDUCATION/TRAINING PROGRAM

## 2023-04-11 PROCEDURE — 82306 VITAMIN D 25 HYDROXY: CPT | Performed by: STUDENT IN AN ORGANIZED HEALTH CARE EDUCATION/TRAINING PROGRAM

## 2023-04-11 PROCEDURE — 84439 ASSAY OF FREE THYROXINE: CPT | Performed by: NURSE PRACTITIONER

## 2023-04-11 RX ORDER — METHIMAZOLE 5 MG/1
TABLET ORAL
Qty: 90 TABLET | Refills: 3 | Status: SHIPPED | OUTPATIENT
Start: 2023-04-11 | End: 2023-05-11 | Stop reason: SDUPTHER

## 2023-04-11 NOTE — PROGRESS NOTES
Latest Reference Range & Units 03/13/23 09:31 04/11/23 08:38   TSH 0.400 - 4.000 uIU/mL 14.326 (H) 3.761   Free T4 0.71 - 1.51 ng/dL 0.80 1.07   Thyrotropin Receptor Ab 0.00 - 1.75 IU/L 2.40 (H)    Thyroid-Stim IG Quantitative <0.10 IU/L 4.12 (H)    (H): Data is abnormally high    Changed from methimazole 20 mg daily in March 2023    You are currently taking   Metthimazole 5 mg daily    Please change to   Methimazole 2.5 mg daily -- 1/2 tab.

## 2023-05-09 ENCOUNTER — LAB VISIT (OUTPATIENT)
Dept: LAB | Facility: HOSPITAL | Age: 28
End: 2023-05-09
Attending: STUDENT IN AN ORGANIZED HEALTH CARE EDUCATION/TRAINING PROGRAM
Payer: COMMERCIAL

## 2023-05-09 DIAGNOSIS — E05.00 GRAVES' DISEASE: ICD-10-CM

## 2023-05-09 LAB
T4 FREE SERPL-MCNC: 0.99 NG/DL (ref 0.71–1.51)
TSH SERPL DL<=0.005 MIU/L-ACNC: 2.42 UIU/ML (ref 0.4–4)

## 2023-05-09 PROCEDURE — 84443 ASSAY THYROID STIM HORMONE: CPT | Performed by: NURSE PRACTITIONER

## 2023-05-09 PROCEDURE — 36415 COLL VENOUS BLD VENIPUNCTURE: CPT | Mod: PN | Performed by: NURSE PRACTITIONER

## 2023-05-09 PROCEDURE — 84439 ASSAY OF FREE THYROXINE: CPT | Performed by: NURSE PRACTITIONER

## 2023-05-11 ENCOUNTER — PATIENT MESSAGE (OUTPATIENT)
Dept: ENDOCRINOLOGY | Facility: CLINIC | Age: 28
End: 2023-05-11
Payer: COMMERCIAL

## 2023-05-11 DIAGNOSIS — E05.00 GRAVES' DISEASE: ICD-10-CM

## 2023-05-11 RX ORDER — METHIMAZOLE 5 MG/1
TABLET ORAL
Qty: 90 TABLET | Refills: 3 | Status: SHIPPED | OUTPATIENT
Start: 2023-05-11

## 2023-06-04 ENCOUNTER — PATIENT MESSAGE (OUTPATIENT)
Dept: PRIMARY CARE CLINIC | Facility: CLINIC | Age: 28
End: 2023-06-04
Payer: COMMERCIAL

## 2023-06-14 ENCOUNTER — LAB VISIT (OUTPATIENT)
Dept: LAB | Facility: HOSPITAL | Age: 28
End: 2023-06-14
Attending: NURSE PRACTITIONER
Payer: COMMERCIAL

## 2023-06-14 DIAGNOSIS — E05.00 GRAVES' DISEASE: ICD-10-CM

## 2023-06-14 LAB
T4 FREE SERPL-MCNC: 1.03 NG/DL (ref 0.71–1.51)
TSH SERPL DL<=0.005 MIU/L-ACNC: 1.2 UIU/ML (ref 0.4–4)

## 2023-06-14 PROCEDURE — 84443 ASSAY THYROID STIM HORMONE: CPT | Performed by: NURSE PRACTITIONER

## 2023-06-14 PROCEDURE — 36415 COLL VENOUS BLD VENIPUNCTURE: CPT | Mod: PN | Performed by: NURSE PRACTITIONER

## 2023-06-14 PROCEDURE — 84439 ASSAY OF FREE THYROXINE: CPT | Performed by: NURSE PRACTITIONER

## 2023-06-15 ENCOUNTER — PATIENT MESSAGE (OUTPATIENT)
Dept: ENDOCRINOLOGY | Facility: CLINIC | Age: 28
End: 2023-06-15
Payer: COMMERCIAL

## 2023-06-15 NOTE — PROGRESS NOTES
Latest Reference Range & Units 05/09/23 13:10 06/14/23 14:48   TSH 0.400 - 4.000 uIU/mL 2.418 1.195   Free T4 0.71 - 1.51 ng/dL 0.99 1.03       I reviewed your lab results. Your thyroid levels are at goal on methimazole 2.5 mg every other day. We can continue to try to wean you by getting labs again in one month but I know you may have an insurance issue. Do you want to stay on the methimazole and recheck labs when you have insurance?

## 2023-06-19 DIAGNOSIS — E05.00 GRAVES' DISEASE: Primary | ICD-10-CM

## 2023-07-20 ENCOUNTER — PATIENT MESSAGE (OUTPATIENT)
Dept: ENDOCRINOLOGY | Facility: CLINIC | Age: 28
End: 2023-07-20
Payer: COMMERCIAL

## 2024-09-20 ENCOUNTER — PATIENT MESSAGE (OUTPATIENT)
Dept: ENDOCRINOLOGY | Facility: CLINIC | Age: 29
End: 2024-09-20
Payer: COMMERCIAL

## 2024-11-18 NOTE — PROGRESS NOTES
ENDOCRINOLOGY CLINIC FOLLOW-UP VISIT  11/21/2024    Subjective:      Patient ID: Mandy Ayon is a 28 y.o. female.    Chief Complaint:  hyperthyroidism    History of Present Illness  28 y.o. woman with hyperthyroidism who presents for follow up. Last visit in March 2023. This is her first visit with me.    With regards to her hyperthyroidism:    No FH of thyroid disease    Was found to have hyperthyroidism on labs but was having symptoms of weight loss, heat intolerance, fatigue, palpitations, bulging eyes and tremors. She was on atenolol which was stopped when she was asymptomatic.     Lab Results   Component Value Date    TSH 1.220 11/21/2024     Etiology: Graves     Thyroid scan: none     Thyroid ultrasound: none     I131 therapy  none      Current hyperthyroid medication:   No medication at this time.   Previously from Feb 2022- July 2023 was taking methimazole, eventually titrated down to 2.5 mg every other day. Due to insurance coverage has not restarted medication since July 2023.      current symptoms:   Unintentional weight gain since 1 year (about 15-20 lbs)  Endorses unexplained fatigue, mentions she can sleep 12-14 hours/day. Has had symptoms since last Endocrinology visit in 2023.    No palpations  Denies excessive hungry    Nl bm  No Hair loss  No Brittle nails  No skin changes    No tremor   No anxiety  No insomnia   Denies heat intolerance     Graves' Ophthalmopathy Clinical Activity Score    Initial Visit and Follow-up:  No   Yes  [x]    []    Spontaneous orbital pain  [x]    []    Gaze evoked orbital pain  [x]    []    Eyelid swelling that is considered to be due to active Graves Ophthalmopathy  [x]    []    Eyelid erythema  [x]    []    Conjunctival redness that is considered to be due to active Graves Ophthalmopathy  [x]    []    Chemosis  [x]    []    Inflammation of caruncle or plica      Active ophthalmopathy: if the score is above 3/7 at the first examination or above 4/10 in successive  "examinations    Insertion of Nexplanon  2/8/22  Denies pregnancy at this time    Denies new eye symptoms: denies     No longer on selenium  Works for a [employer] company   On Aldactone for acne    Endorses coughing with food since 2023.  Denies dysphagia or voice changes  She has not seen eye specialist - Dr Johns - but notes her eyes have improved over the last year on methimazole    Lab Results   Component Value Date    TSH 1.220 11/21/2024    P0WFHGR 236 (H) 03/02/2022    FREET4 1.05 11/21/2024      Latest Reference Range & Units 02/10/22 08:51   Thyrotropin Receptor Ab 0.00 - 1.75 IU/L 9.69 (H)   (H): Data is abnormally high      Objective:     /68 (BP Location: Right arm, Patient Position: Sitting)   Ht 5' 9" (1.753 m)   Wt 80.5 kg (177 lb 7.5 oz)   BMI 26.21 kg/m²   BP Readings from Last 3 Encounters:   11/21/24 100/68   03/23/23 100/64   03/13/23 110/74     Wt Readings from Last 1 Encounters:   11/21/24 0909 80.5 kg (177 lb 7.5 oz)     Body mass index is 26.21 kg/m².    Physical Exam  Constitutional:       Appearance: Normal appearance.   HENT:      Head: Normocephalic and atraumatic.   Neck:      Comments: Thyromegaly on examination although symmetrical with no tenderness.  Thyroid gland move appropriately with swallowing.  Pulmonary:      Effort: Pulmonary effort is normal.   Psychiatric:         Mood and Affect: Mood normal.         Behavior: Behavior normal.         Thought Content: Thought content normal.         Judgment: Judgment normal.       Lab Review:   No results found for: "HGBA1C"  Lab Results   Component Value Date    CHOL 171 04/11/2023    HDL 65 04/11/2023    LDLCALC 94.4 04/11/2023    TRIG 58 04/11/2023    CHOLHDL 38.0 04/11/2023     Lab Results   Component Value Date     04/11/2023    K 3.6 04/11/2023     04/11/2023    CO2 22 (L) 04/11/2023     04/11/2023    BUN 10 04/11/2023    CREATININE 0.9 04/11/2023    CALCIUM 9.8 04/11/2023    PROT 7.5 04/11/2023    ALBUMIN 4.4 " 04/11/2023    BILITOT 1.0 04/11/2023    ALKPHOS 53 (L) 04/11/2023    AST 50 (H) 04/11/2023    ALT 15 04/11/2023    ANIONGAP 10 04/11/2023    ESTGFRAFRICA >60.0 03/02/2022    EGFRNONAA >60.0 03/02/2022    TSH 1.220 11/21/2024     Vit D, 25-Hydroxy   Date Value Ref Range Status   04/11/2023 30 30 - 96 ng/mL Final     Comment:     Vitamin D deficiency.........<10 ng/mL                              Vitamin D insufficiency......10-29 ng/mL       Vitamin D sufficiency........> or equal to 30 ng/mL  Vitamin D toxicity............>100 ng/mL         Assessment and Plan     Graves' disease  Diagnosed biochemically with positive antibodies in 2022  Was started on methimazole and was able to get titrated down to 2.5 mg every other day  Last endocrinology visit was in July 2023 at that time patient did not have insurance and therefore did not continue taking methimazole at that time.    -repeat thyroid labs today depending on levels may consider restarting methimazole (patient aware of side effects)  -repeat TRAB to monitor antibody levels    Graves' eye disease  Was previously advised to make an appointment with Ophthalmology due to concerns of Graves ophthalmopathy, patient was unable to make an appointment.  Endorses improvement in eye symptoms since last visit.    -Graves' Ophthalmopathy Clinical Activity Score 0 on this visit  -will continue to monitor at this time, patient is scheduled to see Ophthalmology in the near future.        Thyromegaly  Thyromegaly on examination.   Patient does endorse complaints of frequent coughing with food, she does deny food getting stuck or breathing difficulties.    -thyroid ultrasound for evaluation      Follow up in about 1 year (around 11/21/2025).    Visit today included increased complexity associated with the care addressed and managing the longitudinal care of the patient due to the serious and/or complex managed problem(s).     Sb Pickett MD  [facility]

## 2024-11-21 ENCOUNTER — OFFICE VISIT (OUTPATIENT)
Dept: ENDOCRINOLOGY | Facility: CLINIC | Age: 29
End: 2024-11-21
Payer: COMMERCIAL

## 2024-11-21 ENCOUNTER — LAB VISIT (OUTPATIENT)
Dept: LAB | Facility: HOSPITAL | Age: 29
End: 2024-11-21
Payer: COMMERCIAL

## 2024-11-21 VITALS
BODY MASS INDEX: 26.29 KG/M2 | DIASTOLIC BLOOD PRESSURE: 68 MMHG | SYSTOLIC BLOOD PRESSURE: 100 MMHG | WEIGHT: 177.5 LBS | HEIGHT: 69 IN

## 2024-11-21 DIAGNOSIS — E01.0 THYROMEGALY: ICD-10-CM

## 2024-11-21 DIAGNOSIS — E05.00 GRAVES' DISEASE: ICD-10-CM

## 2024-11-21 DIAGNOSIS — E05.00 GRAVES' EYE DISEASE: ICD-10-CM

## 2024-11-21 DIAGNOSIS — E05.00 GRAVES' DISEASE: Primary | ICD-10-CM

## 2024-11-21 LAB
T4 FREE SERPL-MCNC: 1.05 NG/DL (ref 0.71–1.51)
TSH SERPL DL<=0.005 MIU/L-ACNC: 1.22 UIU/ML (ref 0.4–4)

## 2024-11-21 PROCEDURE — 84439 ASSAY OF FREE THYROXINE: CPT | Performed by: STUDENT IN AN ORGANIZED HEALTH CARE EDUCATION/TRAINING PROGRAM

## 2024-11-21 PROCEDURE — 99214 OFFICE O/P EST MOD 30 MIN: CPT | Mod: S$GLB,,, | Performed by: STUDENT IN AN ORGANIZED HEALTH CARE EDUCATION/TRAINING PROGRAM

## 2024-11-21 PROCEDURE — 83520 IMMUNOASSAY QUANT NOS NONAB: CPT | Performed by: STUDENT IN AN ORGANIZED HEALTH CARE EDUCATION/TRAINING PROGRAM

## 2024-11-21 PROCEDURE — 3074F SYST BP LT 130 MM HG: CPT | Mod: CPTII,S$GLB,, | Performed by: STUDENT IN AN ORGANIZED HEALTH CARE EDUCATION/TRAINING PROGRAM

## 2024-11-21 PROCEDURE — 99999 PR PBB SHADOW E&M-EST. PATIENT-LVL III: CPT | Mod: PBBFAC,,, | Performed by: STUDENT IN AN ORGANIZED HEALTH CARE EDUCATION/TRAINING PROGRAM

## 2024-11-21 PROCEDURE — 3008F BODY MASS INDEX DOCD: CPT | Mod: CPTII,S$GLB,, | Performed by: STUDENT IN AN ORGANIZED HEALTH CARE EDUCATION/TRAINING PROGRAM

## 2024-11-21 PROCEDURE — 1159F MED LIST DOCD IN RCRD: CPT | Mod: CPTII,S$GLB,, | Performed by: STUDENT IN AN ORGANIZED HEALTH CARE EDUCATION/TRAINING PROGRAM

## 2024-11-21 PROCEDURE — 36415 COLL VENOUS BLD VENIPUNCTURE: CPT | Performed by: STUDENT IN AN ORGANIZED HEALTH CARE EDUCATION/TRAINING PROGRAM

## 2024-11-21 PROCEDURE — 3078F DIAST BP <80 MM HG: CPT | Mod: CPTII,S$GLB,, | Performed by: STUDENT IN AN ORGANIZED HEALTH CARE EDUCATION/TRAINING PROGRAM

## 2024-11-21 PROCEDURE — G2211 COMPLEX E/M VISIT ADD ON: HCPCS | Mod: S$GLB,,, | Performed by: STUDENT IN AN ORGANIZED HEALTH CARE EDUCATION/TRAINING PROGRAM

## 2024-11-21 PROCEDURE — 84443 ASSAY THYROID STIM HORMONE: CPT | Performed by: STUDENT IN AN ORGANIZED HEALTH CARE EDUCATION/TRAINING PROGRAM

## 2024-11-21 NOTE — ASSESSMENT & PLAN NOTE
Was previously advised to make an appointment with Ophthalmology due to concerns of Graves ophthalmopathy, patient was unable to make an appointment.  Endorses improvement in eye symptoms since last visit.    -Graves' Ophthalmopathy Clinical Activity Score 0 on this visit  -will continue to monitor at this time, patient is scheduled to see Ophthalmology in the near future.

## 2024-11-21 NOTE — ASSESSMENT & PLAN NOTE
Diagnosed biochemically with positive antibodies in 2022  Was started on methimazole and was able to get titrated down to 2.5 mg every other day  Last endocrinology visit was in July 2023 at that time patient did not have insurance and therefore did not continue taking methimazole at that time.    -repeat thyroid labs today depending on levels may consider restarting methimazole (patient aware of side effects)  -repeat TRAB to monitor antibody levels

## 2024-11-21 NOTE — PROGRESS NOTES
I have reviewed and concur with the fell[ow's history, assessment, and plan.  I have personally interviewed the patient and all questions were answered.

## 2024-11-21 NOTE — ASSESSMENT & PLAN NOTE
Thyromegaly on examination.   Patient does endorse complaints of frequent coughing with food, she does deny food getting stuck or breathing difficulties.    -thyroid ultrasound for evaluation

## 2024-11-22 LAB — TSH RECEP AB SER-ACNC: <1.1 IU/L (ref 0–1.75)

## 2024-11-25 ENCOUNTER — PATIENT MESSAGE (OUTPATIENT)
Dept: ENDOCRINOLOGY | Facility: CLINIC | Age: 29
End: 2024-11-25
Payer: COMMERCIAL

## 2024-12-12 ENCOUNTER — HOSPITAL ENCOUNTER (OUTPATIENT)
Dept: ENDOCRINOLOGY | Facility: CLINIC | Age: 29
Discharge: HOME | End: 2024-12-12
Attending: STUDENT IN AN ORGANIZED HEALTH CARE EDUCATION/TRAINING PROGRAM
Payer: COMMERCIAL

## 2024-12-12 DIAGNOSIS — E05.00 GRAVES' DISEASE: ICD-10-CM

## 2024-12-12 PROCEDURE — 76536 US EXAM OF HEAD AND NECK: CPT | Mod: S$GLB,,, | Performed by: INTERNAL MEDICINE

## 2024-12-13 ENCOUNTER — PATIENT MESSAGE (OUTPATIENT)
Dept: ENDOCRINOLOGY | Facility: CLINIC | Age: 29
End: 2024-12-13
Payer: COMMERCIAL

## 2025-01-31 ENCOUNTER — OFFICE VISIT (OUTPATIENT)
Dept: OBSTETRICS AND GYNECOLOGY | Facility: CLINIC | Age: 30
End: 2025-01-31
Payer: COMMERCIAL

## 2025-01-31 VITALS
SYSTOLIC BLOOD PRESSURE: 113 MMHG | HEIGHT: 69 IN | BODY MASS INDEX: 27.17 KG/M2 | WEIGHT: 183.44 LBS | DIASTOLIC BLOOD PRESSURE: 64 MMHG

## 2025-01-31 DIAGNOSIS — Z11.3 SCREENING FOR STDS (SEXUALLY TRANSMITTED DISEASES): ICD-10-CM

## 2025-01-31 DIAGNOSIS — Z30.46 ENCOUNTER FOR NEXPLANON REMOVAL: ICD-10-CM

## 2025-01-31 DIAGNOSIS — Z01.419 WELL WOMAN EXAM WITH ROUTINE GYNECOLOGICAL EXAM: Primary | ICD-10-CM

## 2025-01-31 DIAGNOSIS — Z12.4 SCREENING FOR CERVICAL CANCER: ICD-10-CM

## 2025-01-31 DIAGNOSIS — F41.9 ANXIETY DUE TO INVASIVE PROCEDURE: ICD-10-CM

## 2025-01-31 DIAGNOSIS — Z30.09 EVALUATION REGARDING CONTRACEPTION OPTIONS: ICD-10-CM

## 2025-01-31 PROCEDURE — 99385 PREV VISIT NEW AGE 18-39: CPT | Mod: 25,S$GLB,, | Performed by: OBSTETRICS & GYNECOLOGY

## 2025-01-31 PROCEDURE — 87491 CHLMYD TRACH DNA AMP PROBE: CPT | Performed by: OBSTETRICS & GYNECOLOGY

## 2025-01-31 PROCEDURE — 1159F MED LIST DOCD IN RCRD: CPT | Mod: CPTII,S$GLB,, | Performed by: OBSTETRICS & GYNECOLOGY

## 2025-01-31 PROCEDURE — 88175 CYTOPATH C/V AUTO FLUID REDO: CPT | Performed by: OBSTETRICS & GYNECOLOGY

## 2025-01-31 PROCEDURE — 3078F DIAST BP <80 MM HG: CPT | Mod: CPTII,S$GLB,, | Performed by: OBSTETRICS & GYNECOLOGY

## 2025-01-31 PROCEDURE — 11982 REMOVE DRUG IMPLANT DEVICE: CPT | Mod: S$GLB,,, | Performed by: OBSTETRICS & GYNECOLOGY

## 2025-01-31 PROCEDURE — 99999 PR PBB SHADOW E&M-EST. PATIENT-LVL III: CPT | Mod: PBBFAC,,, | Performed by: OBSTETRICS & GYNECOLOGY

## 2025-01-31 PROCEDURE — 1160F RVW MEDS BY RX/DR IN RCRD: CPT | Mod: CPTII,S$GLB,, | Performed by: OBSTETRICS & GYNECOLOGY

## 2025-01-31 PROCEDURE — 3008F BODY MASS INDEX DOCD: CPT | Mod: CPTII,S$GLB,, | Performed by: OBSTETRICS & GYNECOLOGY

## 2025-01-31 PROCEDURE — 3074F SYST BP LT 130 MM HG: CPT | Mod: CPTII,S$GLB,, | Performed by: OBSTETRICS & GYNECOLOGY

## 2025-01-31 RX ORDER — IBUPROFEN 800 MG/1
800 TABLET ORAL EVERY 8 HOURS PRN
Qty: 20 TABLET | Refills: 0 | Status: SHIPPED | OUTPATIENT
Start: 2025-01-31

## 2025-01-31 RX ORDER — CLINDAMYCIN PHOSPHATE/BENZOYL PEROXIDE/ADAPALENE 1.5; 31; 12 MG/G; MG/G; MG/G
GEL TOPICAL
COMMUNITY
Start: 2024-10-24

## 2025-01-31 RX ORDER — ALPRAZOLAM 0.5 MG/1
TABLET ORAL
Qty: 1 TABLET | Refills: 0 | Status: SHIPPED | OUTPATIENT
Start: 2025-01-31

## 2025-01-31 RX ORDER — CLINDAMYCIN PHOSPHATE/BENZOYL PEROXIDE/ADAPALENE 1.5; 31; 12 MG/G; MG/G; MG/G
GEL TOPICAL
COMMUNITY
Start: 2024-12-10

## 2025-01-31 NOTE — PROCEDURES
Removal of Nexplanon Device    Date/Time: 1/31/2025 1:30 PM    Performed by: Do Alanis MD  Authorized by: Do Alanis MD    Consent obtained:  Prior to procedure the appropriate consent was completed and verified  Consent given by:  Patient  Procedure risks and benefits discussed: yes    Patient questions answered: yes    Patient agrees, verbalizes understanding, and wants to proceed: yes    Implant grasped by: hemostat  Removed with no complications: yes    Removal due to expiration: yes    Arm: left arm  Palpation confirms location: yes  Small stab incision was made in arm: yes  Upon removal device was intact: yes  Site was close with steri-strips and pressure bandage applied: yes  Prepped with:  povidone-iodine 7.5% surgical scrub  Local anesthetic:  Lidocaine 1%   The site was cleaned  and prepped in a sterile fashion: yes  Specimen sent to pathology: Yes

## 2025-01-31 NOTE — PROGRESS NOTES
"Subjective     Patient ID: Mandy Ayon is a 29 y.o. female.    Chief Complaint:  Contraception and Well Woman      History of Present Illness  HPI  29 y.o.  presents for annual exam.  Overall doing well.  Wants to discuss birth control - currently has Nexplanon in place, due for removal.  Had very irregular bleeding on Nexplanon - sometimes lasts for 2 days, sometimes up to 5 weeks.  Desires a different method, considering IUD but worried about pain/insertion.  Is very anxious about bodily procedures. Not currently sexually active at the moment, has been in the past without problems.  When not on birth control, her cycles are irregular, usually last 7 days, not too heavy or painful.  No other complaints today.  Last pap  wnl.      GYN & OB History  Patient's last menstrual period was 2024.   Date of Last Pap: 2025    OB History    Para Term  AB Living   0 0 0 0 0 0   SAB IAB Ectopic Multiple Live Births   0 0 0 0 0       Review of Systems  Review of Systems   Constitutional:  Negative for chills and fever.   Respiratory:  Negative for shortness of breath.    Cardiovascular:  Negative for chest pain.   Gastrointestinal:  Negative for abdominal pain, constipation and diarrhea.   Genitourinary:  Negative for dyspareunia, pelvic pain and vaginal discharge.   Musculoskeletal:  Negative for myalgias.   Neurological:  Negative for headaches.          Objective   Physical Exam    /64   Ht 5' 9" (1.753 m)   Wt 83.2 kg (183 lb 6.8 oz)   LMP 2024   BMI 27.09 kg/m²     Gen: NAD  Resp: Normal respiratory effort  Breast: Symmetric, nontender.  No masses.  No skin changes.  No nipple discharge.   Abd: soft, NT  Pelvic: Normal-appearing external female genitalia.  No CMT.  Uterus small, mobile, nontender.  No adnexal masses or tenderness.    Ext: normal ROM, Nexplanon palpable in LUE.   Psych: appropriate affect  Neuro: grossly intact         Assessment and Plan "     Mandy was seen today for contraception and well woman.    Diagnoses and all orders for this visit:    Well woman exam with routine gynecological exam    Screening for cervical cancer  -     Liquid-Based Pap Smear, Screening    Evaluation regarding contraception options  -     Device Authorization Order    Screening for STDs (sexually transmitted diseases)  -     C. trachomatis/N. gonorrhoeae by AMP DNA [facility]; Cervix    Encounter for Nexplanon removal  -     Removal of Nexplanon Device    Anxiety due to invasive procedure  -     ALPRAZolam (XANAX) 0.5 MG tablet; Take one tablet by mouth one hour prior to procedure.    Other orders  -     ibuprofen (ADVIL,MOTRIN) 800 MG tablet; Take 1 tablet (800 mg total) by mouth every 8 (eight) hours as needed for Pain. Take one tablet 1 hour prior to procedure.          Plan:  Routine annual exam with pap smear and breast exam today.  STD screening with GC/CT.   Contraception counseling done.  Discussed IUD and insertion process.  Can pre-treat with ibuprofen and xanax.  Lidocaine at time of insertion.  She does desire to proceed with Mirena IUD - handout given.  Offered Nexplanon removal at time of IUD, desires removal today.  Understands she will be immediately fertile.    Nexplanon removed today - see procedure note.   Counseling done, precautions given, all questions answered.  RTC for IUD insertion.

## 2025-02-03 ENCOUNTER — TELEPHONE (OUTPATIENT)
Dept: OBSTETRICS AND GYNECOLOGY | Facility: CLINIC | Age: 30
End: 2025-02-03
Payer: COMMERCIAL

## 2025-02-03 ENCOUNTER — PATIENT MESSAGE (OUTPATIENT)
Dept: OBSTETRICS AND GYNECOLOGY | Facility: CLINIC | Age: 30
End: 2025-02-03
Payer: COMMERCIAL

## 2025-02-03 DIAGNOSIS — F41.9 ANXIETY DUE TO INVASIVE PROCEDURE: ICD-10-CM

## 2025-02-03 NOTE — TELEPHONE ENCOUNTER
CALLED PATIENT TO [facility] HER KNOW THAT MEDICATION WAS TRANSFER TO [facility] ON [address] AND PHARMACY WILL REACH OUT WHEN MEDICATION IS READY

## 2025-02-05 LAB
C TRACH DNA SPEC QL NAA+PROBE: NOT DETECTED
FINAL PATHOLOGIC DIAGNOSIS: NORMAL
Lab: NORMAL
N GONORRHOEA DNA SPEC QL NAA+PROBE: NOT DETECTED

## 2025-02-05 RX ORDER — ALPRAZOLAM 0.5 MG/1
TABLET ORAL
Qty: 1 TABLET | Refills: 0 | Status: SHIPPED | OUTPATIENT
Start: 2025-02-05

## 2025-02-21 ENCOUNTER — PROCEDURE VISIT (OUTPATIENT)
Dept: OBSTETRICS AND GYNECOLOGY | Facility: CLINIC | Age: 30
End: 2025-02-21
Payer: COMMERCIAL

## 2025-02-21 VITALS — SYSTOLIC BLOOD PRESSURE: 100 MMHG | BODY MASS INDEX: 27.25 KG/M2 | DIASTOLIC BLOOD PRESSURE: 72 MMHG | WEIGHT: 184.5 LBS

## 2025-02-21 DIAGNOSIS — Z30.430 ENCOUNTER FOR IUD INSERTION: Primary | ICD-10-CM

## 2025-02-21 LAB
B-HCG UR QL: NEGATIVE
CTP QC/QA: YES

## 2025-02-21 NOTE — PROCEDURES
Insertion of IUD    Date/Time: 2/21/2025 3:00 PM    Performed by: Do Alanis MD  Authorized by: Do Alanis MD    Consent:     Consent obtained:  Prior to procedure the appropriate consent was completed and verified    Consent given by:  Patient    Procedure risks and benefits discussed: yes      Patient questions answered: yes      Patient agrees, verbalizes understanding, and wants to proceed: yes     Device to be inserted was verified by patient: yes  Insertion Procedure:   1 Intra Uterine Device levonorgestreL 52 mg       Negative urine pregnancy test: yes      Cervix cleaned and prepped: yes      Speculum placed in vagina: yes      Tenaculum applied to cervix: yes      Uterus sounded: yes      Uterus sound depth (cm):  8    IUD inserted with no complications: yes      IUD type:  Mirena    Strings trimmed: yes    Post-procedure:     Patient tolerated procedure well: yes      Patient will follow up after next period: yes

## 2025-04-03 ENCOUNTER — OFFICE VISIT (OUTPATIENT)
Dept: OBSTETRICS AND GYNECOLOGY | Facility: CLINIC | Age: 30
End: 2025-04-03
Payer: COMMERCIAL

## 2025-04-03 VITALS
SYSTOLIC BLOOD PRESSURE: 120 MMHG | DIASTOLIC BLOOD PRESSURE: 76 MMHG | WEIGHT: 183.88 LBS | BODY MASS INDEX: 27.15 KG/M2

## 2025-04-03 DIAGNOSIS — Z30.431 IUD CHECK UP: Primary | ICD-10-CM

## 2025-04-03 PROCEDURE — 99213 OFFICE O/P EST LOW 20 MIN: CPT | Mod: S$GLB,,, | Performed by: OBSTETRICS & GYNECOLOGY

## 2025-04-03 PROCEDURE — 3074F SYST BP LT 130 MM HG: CPT | Mod: CPTII,S$GLB,, | Performed by: OBSTETRICS & GYNECOLOGY

## 2025-04-03 PROCEDURE — 1159F MED LIST DOCD IN RCRD: CPT | Mod: CPTII,S$GLB,, | Performed by: OBSTETRICS & GYNECOLOGY

## 2025-04-03 PROCEDURE — 99999 PR PBB SHADOW E&M-EST. PATIENT-LVL III: CPT | Mod: PBBFAC,,, | Performed by: OBSTETRICS & GYNECOLOGY

## 2025-04-03 PROCEDURE — 3078F DIAST BP <80 MM HG: CPT | Mod: CPTII,S$GLB,, | Performed by: OBSTETRICS & GYNECOLOGY

## 2025-04-03 PROCEDURE — 3008F BODY MASS INDEX DOCD: CPT | Mod: CPTII,S$GLB,, | Performed by: OBSTETRICS & GYNECOLOGY

## 2025-04-03 NOTE — PROGRESS NOTES
Subjective     Patient ID: Mandy Ayon is a 29 y.o. female.    Chief Complaint:  IUD Check      History of Present Illness  HPI  29 y.o.  presents for IUD check.  Mirena IUD inserted 25.  Overall doing well, no pain.  Has not had intercourse since insertion.  No discharge/itch/irritation.  She has had irregular bleeding since insertion.  No true cycle, but had episode of crying, then started bleeding the next day.  Bleeding is light, mostly just spotting, and not every day, but off an on.  No other complaints today.      GYN & OB History  No LMP recorded. Patient has had an implant.   Date of Last Pap: 2025    OB History    Para Term  AB Living   0 0 0 0 0 0   SAB IAB Ectopic Multiple Live Births   0 0 0 0 0       Review of Systems  Review of Systems   Constitutional:  Negative for chills and fever.   Gastrointestinal:  Negative for abdominal pain.   Genitourinary:  Positive for vaginal bleeding. Negative for pelvic pain and vaginal discharge.   Musculoskeletal:  Negative for myalgias.   Neurological:  Negative for headaches.          Objective   Physical Exam    /76   Wt 83.4 kg (183 lb 13.8 oz)   BMI 27.15 kg/m²     Gen: NAD  Resp: Normal respiratory effort  Pelvic: Normal-appearing external female genitalia.  Small amount of mucousy bloody discharge noted.  IUD strings visible at os.  Uterus small, mobile, nontender.  No adnexal masses or tenderness.    Ext: normal ROM  Psych: appropriate affect  Neuro: grossly intact         Assessment and Plan     Mandy was seen today for iud check.    Diagnoses and all orders for this visit:    IUD check up          Plan:  Overall doing well on IUD.   Discussed irregular bleeding, can be common up to 3 months, will continue to monitor.  States overall tolerable, desires to continue with IUD.   Counseling done, precautions given, all questions answered.  RTC for annual, or prn.

## 2025-05-06 ENCOUNTER — LAB VISIT (OUTPATIENT)
Dept: LAB | Facility: HOSPITAL | Age: 30
End: 2025-05-06
Payer: COMMERCIAL

## 2025-05-06 ENCOUNTER — OFFICE VISIT (OUTPATIENT)
Dept: ALLERGY | Facility: CLINIC | Age: 30
End: 2025-05-06
Payer: COMMERCIAL

## 2025-05-06 VITALS — WEIGHT: 182.13 LBS | HEIGHT: 69 IN | BODY MASS INDEX: 26.97 KG/M2

## 2025-05-06 DIAGNOSIS — H10.423 SIMPLE CHRONIC CONJUNCTIVITIS OF BOTH EYES: ICD-10-CM

## 2025-05-06 DIAGNOSIS — J31.0 CHRONIC RHINITIS: Primary | ICD-10-CM

## 2025-05-06 DIAGNOSIS — J31.0 CHRONIC RHINITIS: ICD-10-CM

## 2025-05-06 PROCEDURE — 86003 ALLG SPEC IGE CRUDE XTRC EA: CPT | Mod: 59

## 2025-05-06 PROCEDURE — 1159F MED LIST DOCD IN RCRD: CPT | Mod: CPTII,S$GLB,, | Performed by: ALLERGY & IMMUNOLOGY

## 2025-05-06 PROCEDURE — 3008F BODY MASS INDEX DOCD: CPT | Mod: CPTII,S$GLB,, | Performed by: ALLERGY & IMMUNOLOGY

## 2025-05-06 PROCEDURE — 36415 COLL VENOUS BLD VENIPUNCTURE: CPT | Mod: PO

## 2025-05-06 PROCEDURE — 99204 OFFICE O/P NEW MOD 45 MIN: CPT | Mod: S$GLB,,, | Performed by: ALLERGY & IMMUNOLOGY

## 2025-05-06 PROCEDURE — 86003 ALLG SPEC IGE CRUDE XTRC EA: CPT

## 2025-05-06 PROCEDURE — 1160F RVW MEDS BY RX/DR IN RCRD: CPT | Mod: CPTII,S$GLB,, | Performed by: ALLERGY & IMMUNOLOGY

## 2025-05-06 PROCEDURE — 99999 PR PBB SHADOW E&M-EST. PATIENT-LVL III: CPT | Mod: PBBFAC,,, | Performed by: ALLERGY & IMMUNOLOGY

## 2025-05-06 RX ORDER — AZELASTINE HYDROCHLORIDE 0.5 MG/ML
1 SOLUTION/ DROPS OPHTHALMIC 2 TIMES DAILY PRN
Qty: 6 ML | Refills: 12 | Status: SHIPPED | OUTPATIENT
Start: 2025-05-06 | End: 2026-05-06

## 2025-05-06 RX ORDER — AZELASTINE 1 MG/ML
2 SPRAY, METERED NASAL 2 TIMES DAILY PRN
Qty: 30 ML | Refills: 12 | Status: SHIPPED | OUTPATIENT
Start: 2025-05-06

## 2025-05-06 RX ORDER — CETIRIZINE HYDROCHLORIDE 10 MG/1
10 TABLET ORAL DAILY
COMMUNITY

## 2025-05-06 NOTE — PROGRESS NOTES
Subjective:       Patient ID: Mandy Ayon is a 29 y.o. female.    Chief Complaint:  Allergies      30 yo woman presents for new patient evaluation of possible allergies. Has had most of life but getting worse over last few years. She states she takes zyrtec daily and despite this still has symptoms. Eyes water and wakes with them crusty, they itch and get red and she can't tolerate contacts for more then 4 hours anymore. She has sneeze, runny nose, PND, and congestion. She notices after eating has mucus cough and throat clearing but thinks may be reflux. No chest symptoms,. No H/O asthma. No eczema. Eye and nasal symptoms are worse in AM especially if outside all day prior day. Worse in spring but has all year. Worse when travels. She has a cat but does not feel is trigger. Is on zyrtec, thinks tried nasal spray in past but been a while. No food, insect or latex allergy. No other medical issues. Had tonsils out at about 5 yo, no other ENT surgery.        Environmental History: see history section for home environment  Review of Systems   HENT:  Positive for congestion, postnasal drip, rhinorrhea, sinus pressure, sneezing and sore throat. Negative for ear pain, facial swelling and nosebleeds.    Eyes:  Positive for discharge, redness and itching.   Respiratory:  Positive for cough. Negative for chest tightness, shortness of breath and wheezing.    Skin:  Negative for color change and rash.        Objective:      Physical Exam  Vitals and nursing note reviewed.   Constitutional:       General: She is not in acute distress.     Appearance: Normal appearance. She is not ill-appearing.   HENT:      Nose: No rhinorrhea.   Eyes:      General:         Right eye: No discharge.         Left eye: No discharge.      Conjunctiva/sclera: Conjunctivae normal.   Pulmonary:      Effort: Pulmonary effort is normal. No respiratory distress.   Abdominal:      General: There is no distension.   Skin:     General: Skin is warm and  dry.      Findings: No erythema or rash.   Neurological:      Mental Status: She is alert and oriented to person, place, and time.   Psychiatric:         Mood and Affect: Mood normal.         Behavior: Behavior normal.         Laboratory:   none performed   Assessment:       1. Chronic rhinitis    2. Simple chronic conjunctivitis of both eyes         Plan:       Rhinitis and conjunctivitis- advised can be allergic vs chronic non allergic, will send immunocaps to assess  Continue cetirizine 10 mg daily  Trial azelastine 2 SEN BID as needed and azelastine eye 1 drop each eye BID as needed  Phone review    I spent a total of 45 minutes on the day of the visit.  This includes face to face time and non-face to face time preparing to see the patient (eg, review of tests), obtaining and/or reviewing separately obtained history, documenting clinical information in the electronic or other health record, independently interpreting results and communicating results to the patient/family/caregiver, or care coordinator.

## 2025-05-09 LAB
W ALTERNARIA ALTERNATA, CLASS: NORMAL
W ALTERNARIA ALTERNATA, IGE: <0.1 KU/L
W ASPERGILLUS FUMIGATUS, CLASS: NORMAL
W ASPERGILLUS FUMIGATUS, IGE: <0.1 KU/L
W BAHIA GRASS, CLASS: ABNORMAL
W BAHIA GRASS, IGE: 25.6 KU/L
W BALD CYPRESS, CLASS: NORMAL
W BALD CYPRESS, IGE: <0.1 KU/L
W BERMUDA GRASS, CLASS: ABNORMAL
W BERMUDA GRASS, IGE: 12.5 KU/L
W CAT DANDER, CLASS: NORMAL
W CAT DANDER, IGE: <0.1 KU/L
W CHAETOMIUM GLOBOSUM, CLASS: NORMAL
W CHAETOMIUM GLOBOSUM, IGE: <0.1 KU/L
W CLADOSPORIUM HERBARUM, CLASS: NORMAL
W CLADOSPORIUM HERBARUM, IGE: <0.1 KU/L
W COCKROACH, GERMAN, CLASS: NORMAL
W COCKROACH, GERMAN, IGE: <0.1 KU/L
W COMMON RAGWEED (SHORT), CLASS: ABNORMAL
W COMMON RAGWEED (SHORT), IGE: 0.16 KU/L
W COMMON SILVER BIRCH, CLASS: NORMAL
W COMMON SILVER BIRCH, IGE: <0.1 KU/L
W COTTONWOOD, CLASS: NORMAL
W COTTONWOOD, IGE: <0.1 KU/L
W CURVULARIA LUNATA, CLASS: NORMAL
W CURVULARIA LUNATA, IGE: <0.1 KU/L
W DERMATOPHAGOIDES FARINAE CLASS: NORMAL
W DERMATOPHAGOIDES FARINAE, IGE: <0.1 KU/L
W DERMATOPHAGOIDES PTERONYSSINUS CLASS: NORMAL
W DERMATOPHAGOIDES PTERONYSSINUS, IGE: <0.1 KU/L
W DOG DANDER, CLASS: ABNORMAL
W DOG DANDER, IGE: 0.72 KU/L
W ENGLISH PLANTAIN, RIBWORT, CLASS: NORMAL
W ENGLISH PLANTAIN, RIBWORT, IGE: <0.1 KU/L
W JOHNSON GRASS, CLASS: ABNORMAL
W JOHNSON GRASS, IGE: 14.7 KU/L
W MAPLE LEAF SYC., LONDON PLANE, CLASS: NORMAL
W MAPLE LEAF SYC., LONDON PLANE, IGE: <0.1 KU/L
W MUGWORT (SAGEBRUSH), CLASS: NORMAL
W MUGWORT (SAGEBRUSH), IGE: <0.1 KU/L
W OAK, CLASS: NORMAL
W OAK, IGE: <0.1 KU/L
W PECAN, HICKORY, CLASS: ABNORMAL
W PECAN, HICKORY, IGE: 0.11 KU/L
W PENICILLIUM CHRYSOGENUM, CLASS: NORMAL
W PENICILLIUM CHRYSOGENUM, IGE: <0.1 KU/L
W ROUGH MARSHELDER, CLASS: NORMAL
W ROUGH MARSHELDER, IGE: <0.1 KU/L
W SALTWORT, RUSSIAN THISTLE, CLASS: NORMAL
W SALTWORT, RUSSIAN THISTLE, IGE: <0.1 KU/L
W SETOMELANOMMA ROSTRATA, CLASS: NORMAL
W SETOMELANOMMA ROSTRATA, IGE: <0.1 KU/L
W TIMOTHY GRASS, CLASS: ABNORMAL
W TIMOTHY GRASS, IGE: 37.9 KU/L
W WALNUT TREE, CLASS: NORMAL
W WALNUT TREE, IGE: <0.1 KU/L
W WHITE PINE, CLASS: NORMAL
W WHITE PINE, IGE: <0.1 KU/L
W WILLOW, CLASS: NORMAL
W WILLOW, IGE: <0.1 KU/L

## 2025-05-12 ENCOUNTER — PATIENT MESSAGE (OUTPATIENT)
Dept: ALLERGY | Facility: CLINIC | Age: 30
End: 2025-05-12
Payer: COMMERCIAL